# Patient Record
Sex: FEMALE | Race: OTHER | NOT HISPANIC OR LATINO
[De-identification: names, ages, dates, MRNs, and addresses within clinical notes are randomized per-mention and may not be internally consistent; named-entity substitution may affect disease eponyms.]

---

## 2018-09-11 ENCOUNTER — APPOINTMENT (OUTPATIENT)
Dept: INTERNAL MEDICINE | Facility: CLINIC | Age: 58
End: 2018-09-11
Payer: COMMERCIAL

## 2018-09-11 VITALS
WEIGHT: 214 LBS | OXYGEN SATURATION: 96 % | TEMPERATURE: 97.4 F | BODY MASS INDEX: 34.39 KG/M2 | HEART RATE: 63 BPM | DIASTOLIC BLOOD PRESSURE: 76 MMHG | SYSTOLIC BLOOD PRESSURE: 118 MMHG | HEIGHT: 66 IN

## 2018-09-11 DIAGNOSIS — Z83.3 FAMILY HISTORY OF DIABETES MELLITUS: ICD-10-CM

## 2018-09-11 DIAGNOSIS — Z87.891 PERSONAL HISTORY OF NICOTINE DEPENDENCE: ICD-10-CM

## 2018-09-11 DIAGNOSIS — Z82.69 FAMILY HISTORY OF OTHER DISEASES OF THE MUSCULOSKELETAL SYSTEM AND CONNECTIVE TISSUE: ICD-10-CM

## 2018-09-11 PROCEDURE — 93000 ELECTROCARDIOGRAM COMPLETE: CPT

## 2018-09-11 PROCEDURE — 99386 PREV VISIT NEW AGE 40-64: CPT | Mod: 25

## 2018-09-11 PROCEDURE — 36415 COLL VENOUS BLD VENIPUNCTURE: CPT

## 2018-09-11 PROCEDURE — 94010 BREATHING CAPACITY TEST: CPT

## 2018-09-13 LAB
25(OH)D3 SERPL-MCNC: 40.5 NG/ML
ALBUMIN SERPL ELPH-MCNC: 4 G/DL
ALP BLD-CCNC: 62 U/L
ALT SERPL-CCNC: 37 U/L
ANION GAP SERPL CALC-SCNC: 15 MMOL/L
AST SERPL-CCNC: 31 U/L
BASOPHILS # BLD AUTO: 0.02 K/UL
BASOPHILS NFR BLD AUTO: 0.3 %
BILIRUB SERPL-MCNC: 0.7 MG/DL
BUN SERPL-MCNC: 9 MG/DL
CALCIUM SERPL-MCNC: 9.1 MG/DL
CHLORIDE SERPL-SCNC: 103 MMOL/L
CHOLEST SERPL-MCNC: 142 MG/DL
CHOLEST/HDLC SERPL: 1.7 RATIO
CO2 SERPL-SCNC: 25 MMOL/L
CREAT SERPL-MCNC: 0.62 MG/DL
EOSINOPHIL # BLD AUTO: 0.28 K/UL
EOSINOPHIL NFR BLD AUTO: 4.7 %
GLUCOSE SERPL-MCNC: 113 MG/DL
HBA1C MFR BLD HPLC: 6.8 %
HCT VFR BLD CALC: 37.7 %
HDLC SERPL-MCNC: 85 MG/DL
HGB BLD-MCNC: 11.7 G/DL
IMM GRANULOCYTES NFR BLD AUTO: 0.2 %
LDLC SERPL CALC-MCNC: 46 MG/DL
LDLC SERPL DIRECT ASSAY-MCNC: 42 MG/DL
LYMPHOCYTES # BLD AUTO: 2.26 K/UL
LYMPHOCYTES NFR BLD AUTO: 37.8 %
MAN DIFF?: NORMAL
MCHC RBC-ENTMCNC: 28.4 PG
MCHC RBC-ENTMCNC: 31 GM/DL
MCV RBC AUTO: 91.5 FL
MONOCYTES # BLD AUTO: 0.49 K/UL
MONOCYTES NFR BLD AUTO: 8.2 %
NEUTROPHILS # BLD AUTO: 2.92 K/UL
NEUTROPHILS NFR BLD AUTO: 48.8 %
PLATELET # BLD AUTO: 245 K/UL
POTASSIUM SERPL-SCNC: 4.5 MMOL/L
PROT SERPL-MCNC: 7.3 G/DL
RBC # BLD: 4.12 M/UL
RBC # FLD: 14.5 %
SAVE SPECIMEN: NORMAL
SODIUM SERPL-SCNC: 143 MMOL/L
TRIGL SERPL-MCNC: 53 MG/DL
TSH SERPL-ACNC: 2.03 UIU/ML
VIT B12 SERPL-MCNC: 651 PG/ML
WBC # FLD AUTO: 5.98 K/UL

## 2018-09-13 NOTE — PLAN
[FreeTextEntry1] : will ck thyroid test in 1 mo\par EKG- non specific ant T wave   NSR 61- pt will release old EKG\par pt will release old records- CT, US\par spirometry -nl \par vaccine given by nurse.\par pt requesting letter to be off from work today\par

## 2018-09-13 NOTE — PHYSICAL EXAM
[No Acute Distress] : no acute distress [Well Nourished] : well nourished [Well Developed] : well developed [Well-Appearing] : well-appearing [Normal Sclera/Conjunctiva] : normal sclera/conjunctiva [PERRL] : pupils equal round and reactive to light [Normal Outer Ear/Nose] : the outer ears and nose were normal in appearance [Normal Oropharynx] : the oropharynx was normal [Normal TMs] : both tympanic membranes were normal [Supple] : supple [No Lymphadenopathy] : no lymphadenopathy [Thyroid Normal, No Nodules] : the thyroid was normal and there were no nodules present [No Respiratory Distress] : no respiratory distress  [Clear to Auscultation] : lungs were clear to auscultation bilaterally [No Accessory Muscle Use] : no accessory muscle use [Normal Rate] : normal rate  [Regular Rhythm] : with a regular rhythm [Normal S1, S2] : normal S1 and S2 [No Murmur] : no murmur heard [No Edema] : there was no peripheral edema [Soft] : abdomen soft [Non Tender] : non-tender [Non-distended] : non-distended [No Masses] : no abdominal mass palpated [Normal Bowel Sounds] : normal bowel sounds [Normal Supraclavicular Nodes] : no supraclavicular lymphadenopathy [Normal Axillary Nodes] : no axillary lymphadenopathy [Normal Posterior Cervical Nodes] : no posterior cervical lymphadenopathy [Normal Anterior Cervical Nodes] : no anterior cervical lymphadenopathy [Normal Inguinal Nodes] : no inguinal lymphadenopathy [Grossly Normal Strength/Tone] : grossly normal strength/tone [Coordination Grossly Intact] : coordination grossly intact [No Focal Deficits] : no focal deficits [Normal Affect] : the affect was normal [Normal Insight/Judgement] : insight and judgment were intact [No Carotid Bruits] : no carotid bruits [Pedal Pulses Present] : the pedal pulses are present [de-identified] : no rash on feet

## 2018-09-13 NOTE — HEALTH RISK ASSESSMENT
[0] : 2) Feeling down, depressed, or hopeless: Not at all (0) [Patient reported mammogram was normal] : Patient reported mammogram was normal [Patient reported PAP Smear was normal] : Patient reported PAP Smear was normal [HIV test declined] : HIV test declined [MammogramDate] : 3/18 [PapSmearDate] : 3/18 [PapSmearComments] : last GYN visit- 3/18-breast exam [ColonoscopyDate] : never had colonoscopy [de-identified] : wear glasses.  not seen ophtho recently [de-identified] : last seen dentist- 2 yr ago

## 2018-09-13 NOTE — HISTORY OF PRESENT ILLNESS
[FreeTextEntry1] : CPE [de-identified] : vaccine- had tdap.  refused flu vaccine\par  diet- pt trying to eating healthy\par exercise- no\par started thyroid meds 2 wk ago\par \par c/o itching - upper back-- sev yr- occurs weekly- duration- sev hr. \par \par pt states she had CT abd /chest.  US abd- something was enlarged?\par c/o  heaviness of her chest and dyspnea, abd bloating, diarrhea.- occurs w fatty meals.- and gets diarrhea. resolves after bm.  these symptoms since gastric bypass.   no CP w activity. \par states she has a lump - RUQ- " pokes up after lift heavy lifting" - pt was told she has no hernia on CT. \par \par \par pt had stopped fe. \par \par

## 2018-09-25 ENCOUNTER — RESULT REVIEW (OUTPATIENT)
Age: 58
End: 2018-09-25

## 2018-09-25 ENCOUNTER — RESULT CHARGE (OUTPATIENT)
Age: 58
End: 2018-09-25

## 2018-10-23 ENCOUNTER — APPOINTMENT (OUTPATIENT)
Dept: GASTROENTEROLOGY | Facility: CLINIC | Age: 58
End: 2018-10-23

## 2018-11-02 ENCOUNTER — RX RENEWAL (OUTPATIENT)
Age: 58
End: 2018-11-02

## 2019-03-08 ENCOUNTER — APPOINTMENT (OUTPATIENT)
Dept: GASTROENTEROLOGY | Facility: CLINIC | Age: 59
End: 2019-03-08
Payer: COMMERCIAL

## 2019-03-08 VITALS
WEIGHT: 214 LBS | SYSTOLIC BLOOD PRESSURE: 121 MMHG | TEMPERATURE: 98 F | DIASTOLIC BLOOD PRESSURE: 80 MMHG | HEIGHT: 66 IN | BODY MASS INDEX: 34.39 KG/M2

## 2019-03-08 DIAGNOSIS — K83.8 OTHER SPECIFIED DISEASES OF BILIARY TRACT: ICD-10-CM

## 2019-03-08 DIAGNOSIS — R14.0 ABDOMINAL DISTENSION (GASEOUS): ICD-10-CM

## 2019-03-08 PROCEDURE — 99203 OFFICE O/P NEW LOW 30 MIN: CPT

## 2019-03-08 NOTE — HISTORY OF PRESENT ILLNESS
[FreeTextEntry1] : 59 yo with h/o gastric bypass in 2004, s/p CT scan in 2018  dilated IHD and EHD upto 16mm, s/p cholecystectomy,\par nodularity medial aspect of duodenal sweep  and  region of ampulla. r/o neoplasm\par \par Patient with c/o after eating patient feels nausea after eating fried foods. no weight loss. chronic nausea, no vomiting. normal bms, no brbpr, no melena.\par \par patient never had a colonoscopy.\par \par no family h/o colon cancer.

## 2019-03-08 NOTE — REVIEW OF SYSTEMS
[Fever] : no fever [Chills] : no chills [Red Eyes] : eyes not red [Heart Rate Is Slow] : the heart rate was not slow [Heart Rate Is Fast] : the heart rate was not fast [Chest Pain] : no chest pain [Shortness Of Breath] : no shortness of breath [Wheezing] : no wheezing [Cough] : no cough [SOB on Exertion] : no shortness of breath during exertion [As Noted in HPI] : as noted in HPI [Arthralgias] : no arthralgias [Joint Pain] : no joint pain [Skin Lesions] : no skin lesions [Dizziness] : no dizziness [Fainting] : no fainting [Anxiety] : no anxiety [Depression] : no depression [Muscle Weakness] : no muscle weakness [Easy Bleeding] : no tendency for easy bleeding [Easy Bruising] : no tendency for easy bruising

## 2019-03-08 NOTE — PHYSICAL EXAM
[General Appearance - Alert] : alert [General Appearance - In No Acute Distress] : in no acute distress [Sclera] : the sclera and conjunctiva were normal [Hearing Threshold Finger Rub Not [place]] : hearing was normal [Respiration, Rhythm And Depth] : normal respiratory rhythm and effort [Auscultation Breath Sounds / Voice Sounds] : lungs were clear to auscultation bilaterally [Heart Sounds] : normal S1 and S2 [Bowel Sounds] : normal bowel sounds [Abdomen Soft] : soft [Abdomen Tenderness] : non-tender [FreeTextEntry1] : well healed scar, approximately 1cm hard nodule in ruq [No CVA Tenderness] : no ~M costovertebral angle tenderness [Abnormal Walk] : normal gait [Skin Color & Pigmentation] : normal skin color and pigmentation [] : no rash [Skin Lesions] : no skin lesions [Sensation] : the sensory exam was normal to light touch and pinprick [Motor Exam] : the motor exam was normal [No Focal Deficits] : no focal deficits [Oriented To Time, Place, And Person] : oriented to person, place, and time

## 2019-03-08 NOTE — ASSESSMENT
[FreeTextEntry1] : 1.dilated bilary tree wth possible ampullary, duodenal lesion on ct scan done 2018,normal lfts.h/o cholecystectomy, vague ruq pain and nodule.\par \par Plan recommend referral for EUS \par         -recent labs requested from pcp,pt to have faxed to me\par \par 2. ruq nodule and discomfort\par \par plan abdominal us\par          ppi daily\par       antireflux diet\par \par 3. average risk for colon cancer recommend colonoscopy for screenign\par \par Discussed risks including but not limited to bleeding,infection,drug reaction, perforation,missed lesion,benefits and alternatives of colonoscopy/egd with patient  including no\par treatment and patient consents to procedure.\par \par plan colonoscopy to be scheduled.

## 2019-03-13 ENCOUNTER — FORM ENCOUNTER (OUTPATIENT)
Age: 59
End: 2019-03-13

## 2019-03-14 ENCOUNTER — APPOINTMENT (OUTPATIENT)
Dept: RADIOLOGY | Facility: CLINIC | Age: 59
End: 2019-03-14

## 2019-03-14 ENCOUNTER — OUTPATIENT (OUTPATIENT)
Dept: OUTPATIENT SERVICES | Facility: HOSPITAL | Age: 59
LOS: 1 days | End: 2019-03-14
Payer: COMMERCIAL

## 2019-03-14 ENCOUNTER — APPOINTMENT (OUTPATIENT)
Dept: INTERNAL MEDICINE | Facility: CLINIC | Age: 59
End: 2019-03-14
Payer: COMMERCIAL

## 2019-03-14 VITALS
SYSTOLIC BLOOD PRESSURE: 128 MMHG | HEIGHT: 66 IN | WEIGHT: 214 LBS | TEMPERATURE: 98 F | DIASTOLIC BLOOD PRESSURE: 78 MMHG | BODY MASS INDEX: 34.39 KG/M2

## 2019-03-14 DIAGNOSIS — R07.81 PLEURODYNIA: ICD-10-CM

## 2019-03-14 DIAGNOSIS — Z00.8 ENCOUNTER FOR OTHER GENERAL EXAMINATION: ICD-10-CM

## 2019-03-14 PROCEDURE — 71100 X-RAY EXAM RIBS UNI 2 VIEWS: CPT

## 2019-03-14 PROCEDURE — 71046 X-RAY EXAM CHEST 2 VIEWS: CPT | Mod: 26

## 2019-03-14 PROCEDURE — 99214 OFFICE O/P EST MOD 30 MIN: CPT

## 2019-03-14 PROCEDURE — 71046 X-RAY EXAM CHEST 2 VIEWS: CPT

## 2019-03-14 PROCEDURE — 71100 X-RAY EXAM RIBS UNI 2 VIEWS: CPT | Mod: 26,LT

## 2019-03-20 ENCOUNTER — RESULT REVIEW (OUTPATIENT)
Age: 59
End: 2019-03-20

## 2019-04-01 ENCOUNTER — FORM ENCOUNTER (OUTPATIENT)
Age: 59
End: 2019-04-01

## 2019-04-02 ENCOUNTER — APPOINTMENT (OUTPATIENT)
Dept: ULTRASOUND IMAGING | Facility: CLINIC | Age: 59
End: 2019-04-02
Payer: COMMERCIAL

## 2019-04-02 ENCOUNTER — OUTPATIENT (OUTPATIENT)
Dept: OUTPATIENT SERVICES | Facility: HOSPITAL | Age: 59
LOS: 1 days | End: 2019-04-02
Payer: COMMERCIAL

## 2019-04-02 DIAGNOSIS — Z98.84 BARIATRIC SURGERY STATUS: ICD-10-CM

## 2019-04-02 DIAGNOSIS — R14.0 ABDOMINAL DISTENSION (GASEOUS): ICD-10-CM

## 2019-04-02 PROCEDURE — 76700 US EXAM ABDOM COMPLETE: CPT | Mod: 26

## 2019-04-02 PROCEDURE — 76700 US EXAM ABDOM COMPLETE: CPT

## 2019-04-04 ENCOUNTER — RX RENEWAL (OUTPATIENT)
Age: 59
End: 2019-04-04

## 2019-05-02 ENCOUNTER — APPOINTMENT (OUTPATIENT)
Dept: INTERNAL MEDICINE | Facility: CLINIC | Age: 59
End: 2019-05-02

## 2019-05-09 ENCOUNTER — RX RENEWAL (OUTPATIENT)
Age: 59
End: 2019-05-09

## 2019-05-23 ENCOUNTER — LABORATORY RESULT (OUTPATIENT)
Age: 59
End: 2019-05-23

## 2019-05-23 ENCOUNTER — APPOINTMENT (OUTPATIENT)
Dept: GASTROENTEROLOGY | Facility: CLINIC | Age: 59
End: 2019-05-23
Payer: COMMERCIAL

## 2019-05-23 PROCEDURE — 45380 COLONOSCOPY AND BIOPSY: CPT | Mod: 59

## 2019-05-23 PROCEDURE — 45385 COLONOSCOPY W/LESION REMOVAL: CPT

## 2019-06-25 ENCOUNTER — APPOINTMENT (OUTPATIENT)
Dept: INTERNAL MEDICINE | Facility: CLINIC | Age: 59
End: 2019-06-25
Payer: COMMERCIAL

## 2019-06-25 VITALS
TEMPERATURE: 97.5 F | BODY MASS INDEX: 34.39 KG/M2 | HEIGHT: 66 IN | OXYGEN SATURATION: 98 % | WEIGHT: 214 LBS | DIASTOLIC BLOOD PRESSURE: 72 MMHG | SYSTOLIC BLOOD PRESSURE: 121 MMHG | HEART RATE: 70 BPM

## 2019-06-25 VITALS — DIASTOLIC BLOOD PRESSURE: 84 MMHG | SYSTOLIC BLOOD PRESSURE: 120 MMHG
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
## 2019-06-25 DIAGNOSIS — Z87.828 PERSONAL HISTORY OF OTHER (HEALED) PHYSICAL INJURY AND TRAUMA: ICD-10-CM

## 2019-06-25 PROCEDURE — 36415 COLL VENOUS BLD VENIPUNCTURE: CPT

## 2019-06-25 PROCEDURE — 99214 OFFICE O/P EST MOD 30 MIN: CPT | Mod: 25

## 2019-06-25 RX ORDER — OMEPRAZOLE 40 MG/1
40 CAPSULE, DELAYED RELEASE ORAL
Qty: 30 | Refills: 2 | Status: DISCONTINUED | COMMUNITY
Start: 2019-03-08 | End: 2019-06-25

## 2019-06-25 NOTE — HISTORY OF PRESENT ILLNESS
[de-identified] : US   abd- small fluid collection  ? retained suture- pt still has to make appt w Dr. Dickinson.\par also pt was told to f/u w [facility] for EUS\par 5/2019- colonoscopy- polyp. rpt 3 yr\par \par abd bloating, nausea- greasy food cause nausea.  pt is not taking PPI\par \par DM-  Patient is compliant with med.\par  Patient  is compliant with diet.\par  Patient  exercises- walking 4 day/ wk- no dyspnea\par  She denies hypoglycemic episodes.  The patient has no dizziness/ tremor/ diaphoresis \par    the fasting glucose is 110-115\par The bedtime glucose is not checked\par The postprandial glucose is not checked\par Patient has been seen by Ophthalmology.-201`7\par Patient has been seen by podiatry.-2018\par vaccines-pt will ck records\par \par lipid- statin- pt is compliant w meds.  non compliant w  diet- no myalgia\par \par \par  [FreeTextEntry1] : DM

## 2019-06-25 NOTE — PLAN
[FreeTextEntry1] : pt will release vaccination and old records \par pt reminded to f/u w Dr. Dickinson, Dr. Cantu\par pt wants HIV test mailed to her.

## 2019-06-25 NOTE — PHYSICAL EXAM
[Right Foot Was Examined] : Right foot ~C was examined [Left Foot Was Examined] : left foot ~C was examined [] : normal

## 2019-06-26 ENCOUNTER — RX CHANGE (OUTPATIENT)
Age: 59
End: 2019-06-26

## 2019-06-26 ENCOUNTER — RX RENEWAL (OUTPATIENT)
Age: 59
End: 2019-06-26

## 2019-06-26 LAB
ALBUMIN SERPL ELPH-MCNC: 4.8 G/DL
ALP BLD-CCNC: 67 U/L
ALT SERPL-CCNC: 28 U/L
ANION GAP SERPL CALC-SCNC: 10 MMOL/L
AST SERPL-CCNC: 29 U/L
BILIRUB SERPL-MCNC: 0.7 MG/DL
BUN SERPL-MCNC: 13 MG/DL
CALCIUM SERPL-MCNC: 9.6 MG/DL
CHLORIDE SERPL-SCNC: 102 MMOL/L
CHOLEST SERPL-MCNC: 164 MG/DL
CHOLEST/HDLC SERPL: 1.6 RATIO
CO2 SERPL-SCNC: 28 MMOL/L
CREAT SERPL-MCNC: 0.57 MG/DL
ESTIMATED AVERAGE GLUCOSE: 154 MG/DL
GLUCOSE SERPL-MCNC: 118 MG/DL
HBA1C MFR BLD HPLC: 7 %
HCV AB SER QL: NONREACTIVE
HCV S/CO RATIO: 0.11 S/CO
HDLC SERPL-MCNC: 100 MG/DL
HIV1+2 AB SPEC QL IA.RAPID: NONREACTIVE
LDLC SERPL CALC-MCNC: 56 MG/DL
POTASSIUM SERPL-SCNC: 4.2 MMOL/L
PROT SERPL-MCNC: 8 G/DL
SAVE SPECIMEN: NORMAL
SODIUM SERPL-SCNC: 140 MMOL/L
TRIGL SERPL-MCNC: 38 MG/DL
TSH SERPL-ACNC: 2.09 UIU/ML

## 2019-07-08 ENCOUNTER — RESULT REVIEW (OUTPATIENT)
Age: 59
End: 2019-07-08

## 2019-07-15 ENCOUNTER — RESULT REVIEW (OUTPATIENT)
Age: 59
End: 2019-07-15

## 2019-07-15 RX ORDER — LEVOTHYROXINE SODIUM 0.03 MG/1
25 TABLET ORAL DAILY
Qty: 90 | Refills: 1 | Status: DISCONTINUED | COMMUNITY
Start: 1900-01-01 | End: 2019-07-15

## 2019-08-05 ENCOUNTER — APPOINTMENT (OUTPATIENT)
Dept: PLASTIC SURGERY | Facility: CLINIC | Age: 59
End: 2019-08-05

## 2019-08-29 ENCOUNTER — APPOINTMENT (OUTPATIENT)
Dept: SURGERY | Facility: CLINIC | Age: 59
End: 2019-08-29
Payer: COMMERCIAL

## 2019-08-29 VITALS
HEART RATE: 67 BPM | WEIGHT: 214 LBS | OXYGEN SATURATION: 99 % | DIASTOLIC BLOOD PRESSURE: 84 MMHG | HEIGHT: 67 IN | RESPIRATION RATE: 15 BRPM | SYSTOLIC BLOOD PRESSURE: 127 MMHG | TEMPERATURE: 98.4 F | BODY MASS INDEX: 33.59 KG/M2

## 2019-08-29 DIAGNOSIS — L98.9 DISORDER OF THE SKIN AND SUBCUTANEOUS TISSUE, UNSPECIFIED: ICD-10-CM

## 2019-08-29 PROCEDURE — 99244 OFF/OP CNSLTJ NEW/EST MOD 40: CPT

## 2019-08-29 RX ORDER — MULTIVITAMIN
TABLET ORAL
Refills: 0 | Status: ACTIVE | COMMUNITY

## 2019-08-29 NOTE — PHYSICAL EXAM
[Normal Breath Sounds] : Normal breath sounds [Normal Heart Sounds] : normal heart sounds [No HSM] : no hepatosplenomegaly [No Rash or Lesion] : No rash or lesion [Alert] : alert [Oriented to Person] : oriented to person [Oriented to Place] : oriented to place [Oriented to Time] : oriented to time [Calm] : calm [Abdominal Masses] : No abdominal masses [de-identified] : wnl [de-identified] : Well-developed well-nourished overweight black female in no acute distress [Abdomen Tenderness] : ~T ~M No abdominal tenderness [de-identified] : Normal strength and gait [de-identified] : In the area of the right upper quadrant drain site there is a palpable mass which is nontender. The mass feels subcutaneous.

## 2019-08-29 NOTE — ASSESSMENT
[FreeTextEntry1] : I suggested to the patient that as an outpatient we should explore this area and resect the underlying pathology which could be a suture, retained stone, or piece of a drain. The patient stated that she will go home and think about it and if she decides to undergo the procedure will call our office. She understands that she will need preop medical clearance from her primary care doctor.

## 2019-08-29 NOTE — HISTORY OF PRESENT ILLNESS
[de-identified] : Yris Huerta is a 59 y/o female seen for an evaluation of a lesion on a surgical suture line.. The patient a number of years ago had an open cholecystectomy. In the postoperative period she had a drain in the right upper quadrant. That drain was removed and the patient states he was exceedingly painful when the surgeon pulled the tube out. Since then the patient has complained of something in the right upper quadrant around his drain site. Recently the patient had an ultrasound of the area which showed some fluid. Since that ultrasound fluid is drained from this old drain site. The patient states she still feels something under the area.  The patient states she has never had any fever or chills related to this area.

## 2019-09-11 ENCOUNTER — OTHER (OUTPATIENT)
Age: 59
End: 2019-09-11

## 2019-09-23 ENCOUNTER — CLINICAL ADVICE (OUTPATIENT)
Age: 59
End: 2019-09-23

## 2019-09-24 ENCOUNTER — RX RENEWAL (OUTPATIENT)
Age: 59
End: 2019-09-24

## 2019-09-30 ENCOUNTER — FORM ENCOUNTER (OUTPATIENT)
Age: 59
End: 2019-09-30

## 2019-10-01 ENCOUNTER — APPOINTMENT (OUTPATIENT)
Dept: MRI IMAGING | Facility: CLINIC | Age: 59
End: 2019-10-01
Payer: COMMERCIAL

## 2019-10-01 ENCOUNTER — OUTPATIENT (OUTPATIENT)
Dept: OUTPATIENT SERVICES | Facility: HOSPITAL | Age: 59
LOS: 1 days | End: 2019-10-01
Payer: COMMERCIAL

## 2019-10-01 DIAGNOSIS — K83.8 OTHER SPECIFIED DISEASES OF BILIARY TRACT: ICD-10-CM

## 2019-10-01 PROCEDURE — 74183 MRI ABD W/O CNTR FLWD CNTR: CPT

## 2019-10-01 PROCEDURE — A9585: CPT

## 2019-10-01 PROCEDURE — 74183 MRI ABD W/O CNTR FLWD CNTR: CPT | Mod: 26

## 2019-11-20 ENCOUNTER — RX RENEWAL (OUTPATIENT)
Age: 59
End: 2019-11-20

## 2019-12-02 ENCOUNTER — APPOINTMENT (OUTPATIENT)
Dept: INTERNAL MEDICINE | Facility: CLINIC | Age: 59
End: 2019-12-02
Payer: COMMERCIAL

## 2019-12-02 VITALS
OXYGEN SATURATION: 97 % | BODY MASS INDEX: 33.27 KG/M2 | WEIGHT: 212 LBS | HEART RATE: 64 BPM | DIASTOLIC BLOOD PRESSURE: 74 MMHG | HEIGHT: 67 IN | TEMPERATURE: 98 F | SYSTOLIC BLOOD PRESSURE: 118 MMHG

## 2019-12-02 DIAGNOSIS — Z11.59 ENCOUNTER FOR SCREENING FOR OTHER VIRAL DISEASES: ICD-10-CM

## 2019-12-02 DIAGNOSIS — Z11.4 ENCOUNTER FOR SCREENING FOR HUMAN IMMUNODEFICIENCY VIRUS [HIV]: ICD-10-CM

## 2019-12-02 DIAGNOSIS — Z86.39 PERSONAL HISTORY OF OTHER ENDOCRINE, NUTRITIONAL AND METABOLIC DISEASE: ICD-10-CM

## 2019-12-02 DIAGNOSIS — R07.89 OTHER CHEST PAIN: ICD-10-CM

## 2019-12-02 PROCEDURE — 36415 COLL VENOUS BLD VENIPUNCTURE: CPT

## 2019-12-02 PROCEDURE — 99214 OFFICE O/P EST MOD 30 MIN: CPT | Mod: 25

## 2019-12-02 PROCEDURE — 93000 ELECTROCARDIOGRAM COMPLETE: CPT

## 2019-12-02 RX ORDER — COLD-HOT PACK
125 MCG EACH MISCELLANEOUS
Refills: 0 | Status: DISCONTINUED | COMMUNITY
End: 2019-12-02

## 2019-12-02 RX ORDER — ALPRAZOLAM 0.5 MG/1
0.5 TABLET ORAL
Qty: 2 | Refills: 0 | Status: DISCONTINUED | COMMUNITY
Start: 2019-09-23 | End: 2019-12-02

## 2019-12-02 NOTE — PHYSICAL EXAM
[Right Foot Was Examined] : Right foot ~C was examined [Left Foot Was Examined] : left foot ~C was examined [] : normal [TWNoteComboBox5] : +2

## 2019-12-02 NOTE — HISTORY OF PRESENT ILLNESS
[FreeTextEntry1] : DM [de-identified] : DM-  Patient is compliant with med.\par  Patient  is compliant with diet.\par  Patient  exercises-no\par She had hypoglycemic episode ( HA)when she didn't eat this AM so had coffee.  \par    the fasting glucose is not checked\par The bedtime glucose is not checked\par The postprandial glucose is not checked.\par Patient has been seen by Ophthalmology.- 10/19\par Patient has been seen by podiatry.-9/19\par vaccines- pt will ck rec for pneum 23.  pt had flu vac 10/19\par \par pt has chest tightness when she has nausea- started 2 mo ago.  chest tightness, nausea- occurs after greasy food, dairy.\par chest tightness is not related to activity- duration- 30 min.  - occurs daily. no alleviating factors.  no assoc w dyspnea.  no meds taken\par \par lipid- statin- pt is compliant w meds, diet.  no myalgia.  pt had c/o muscle cramping of her calves - 7/19which has since then resolved\par U microalbumin\par pt still has nausea.  pt had MRI- dilated extrahepatic duct.  pt reji for EGD.- occasionally takes ibuprofen for knee pain\par pt has appt for Dr. Dickinson- pt has drainage over the previous area of surgery.  MRCP was rec by GI\par pt has vaginal itching - pt had used monistat- improved symptoms but dev symptoms again in less than 1mo.  pt made appt w GYN\par bl knee pain- chronic- had knee surgery 2009,2010-pt has appt w Ortho\par pt states she hasn't taken thyroid meds 09/'18

## 2019-12-02 NOTE — HEALTH RISK ASSESSMENT
[Never (0 pts)] : Never (0 points) [No] : In the past 12 months have you used drugs other than those required for medical reasons? No [0] : 1) Little interest or pleasure doing things: Not at all (0) [Audit-CScore] : 0

## 2019-12-03 LAB
25(OH)D3 SERPL-MCNC: 33.4 NG/ML
ALBUMIN SERPL ELPH-MCNC: 4.3 G/DL
ALP BLD-CCNC: 61 U/L
ALT SERPL-CCNC: 24 U/L
ANION GAP SERPL CALC-SCNC: 11 MMOL/L
AST SERPL-CCNC: 29 U/L
BILIRUB SERPL-MCNC: 0.7 MG/DL
BUN SERPL-MCNC: 11 MG/DL
CALCIUM SERPL-MCNC: 9.5 MG/DL
CHLORIDE SERPL-SCNC: 102 MMOL/L
CHOLEST SERPL-MCNC: 161 MG/DL
CHOLEST/HDLC SERPL: 2 RATIO
CO2 SERPL-SCNC: 28 MMOL/L
CREAT SERPL-MCNC: 0.59 MG/DL
ESTIMATED AVERAGE GLUCOSE: 148 MG/DL
GLUCOSE SERPL-MCNC: 128 MG/DL
HBA1C MFR BLD HPLC: 6.8 %
HDLC SERPL-MCNC: 82 MG/DL
LDLC SERPL CALC-MCNC: 68 MG/DL
POTASSIUM SERPL-SCNC: 4.2 MMOL/L
PROT SERPL-MCNC: 7.3 G/DL
SAVE SPECIMEN: NORMAL
SODIUM SERPL-SCNC: 141 MMOL/L
TRIGL SERPL-MCNC: 53 MG/DL
VIT B12 SERPL-MCNC: 1965 PG/ML

## 2019-12-10 ENCOUNTER — APPOINTMENT (OUTPATIENT)
Dept: SURGERY | Facility: CLINIC | Age: 59
End: 2019-12-10
Payer: COMMERCIAL

## 2019-12-10 VITALS
OXYGEN SATURATION: 98 % | HEIGHT: 67 IN | RESPIRATION RATE: 15 BRPM | DIASTOLIC BLOOD PRESSURE: 83 MMHG | WEIGHT: 208 LBS | TEMPERATURE: 98.5 F | BODY MASS INDEX: 32.65 KG/M2 | SYSTOLIC BLOOD PRESSURE: 120 MMHG | HEART RATE: 62 BPM

## 2019-12-10 PROCEDURE — 99203 OFFICE O/P NEW LOW 30 MIN: CPT

## 2019-12-10 RX ORDER — MULTIVITAMIN
TABLET ORAL
Refills: 0 | Status: DISCONTINUED | COMMUNITY
End: 2019-12-10

## 2019-12-10 NOTE — PHYSICAL EXAM
[Normal Breath Sounds] : Normal breath sounds [Normal Rate and Rhythm] : normal rate and rhythm [de-identified] : nad [de-identified] : Well-healed midline and right subcostal incisions. There is a tiny open area just cephalad to her previous right subcostal incision. There is a small area of underlying induration without obvious cellulitis or abscess. There is no palpable subcutaneous foreign body. In her mid upper back there is a small sebaceous cyst without evidence of infection

## 2019-12-10 NOTE — HISTORY OF PRESENT ILLNESS
[de-identified] : Yris is a 60 y/o female here for evaluation of abdominal pain. Abdominal ultrasound from 4/2/19 demonstrated no sonographic evidence of abdominal wall hernia. Small fluid collection in the subcutaneous fat of the right upper quadrant corresponding to the site of the palpable finding. Presence of a curvilinear object within the fluid suggests the possibility of retained suture or catheter fragment, with surrounding reactive fluid. Dilated common bile duct. She subsequently underwent an MRI of the abdomen and pelvis which was read as no abdominal wall abnormality. She complains of mild discomfort and persistent drainage from a small open area just cephalad to her previous right subcostal incision. She also has a intermittent sebaceous cyst of her upper mid back which drains spontaneously

## 2019-12-10 NOTE — ASSESSMENT
[FreeTextEntry1] : In summary the patient has a chronic small right upper quadrant wound. I will review her MRI and is there is evidence of foreign body I will recommend wound exploration with preoperative radiologic marking. She would also like her sebaceous cysts of her mid upper back excised.

## 2019-12-12 ENCOUNTER — FORM ENCOUNTER (OUTPATIENT)
Age: 59
End: 2019-12-12

## 2019-12-13 ENCOUNTER — OUTPATIENT (OUTPATIENT)
Dept: OUTPATIENT SERVICES | Facility: HOSPITAL | Age: 59
LOS: 1 days | End: 2019-12-13
Payer: COMMERCIAL

## 2019-12-13 ENCOUNTER — APPOINTMENT (OUTPATIENT)
Dept: CT IMAGING | Facility: CLINIC | Age: 59
End: 2019-12-13
Payer: COMMERCIAL

## 2019-12-13 DIAGNOSIS — Z00.8 ENCOUNTER FOR OTHER GENERAL EXAMINATION: ICD-10-CM

## 2019-12-13 PROCEDURE — 74177 CT ABD & PELVIS W/CONTRAST: CPT

## 2019-12-13 PROCEDURE — 74177 CT ABD & PELVIS W/CONTRAST: CPT | Mod: 26

## 2019-12-13 PROCEDURE — 82565 ASSAY OF CREATININE: CPT

## 2019-12-16 ENCOUNTER — OTHER (OUTPATIENT)
Age: 59
End: 2019-12-16

## 2019-12-17 ENCOUNTER — CHART COPY (OUTPATIENT)
Age: 59
End: 2019-12-17

## 2020-01-24 ENCOUNTER — APPOINTMENT (OUTPATIENT)
Dept: INTERNAL MEDICINE | Facility: CLINIC | Age: 60
End: 2020-01-24

## 2020-02-06 ENCOUNTER — APPOINTMENT (OUTPATIENT)
Dept: PLASTIC SURGERY | Facility: CLINIC | Age: 60
End: 2020-02-06
Payer: COMMERCIAL

## 2020-02-06 PROCEDURE — 99244 OFF/OP CNSLTJ NEW/EST MOD 40: CPT

## 2020-02-11 ENCOUNTER — APPOINTMENT (OUTPATIENT)
Dept: INTERNAL MEDICINE | Facility: CLINIC | Age: 60
End: 2020-02-11
Payer: COMMERCIAL

## 2020-02-11 VITALS
HEART RATE: 65 BPM | BODY MASS INDEX: 33.27 KG/M2 | WEIGHT: 212 LBS | SYSTOLIC BLOOD PRESSURE: 118 MMHG | TEMPERATURE: 97.7 F | HEIGHT: 67 IN | OXYGEN SATURATION: 98 % | DIASTOLIC BLOOD PRESSURE: 78 MMHG

## 2020-02-11 PROCEDURE — 90732 PPSV23 VACC 2 YRS+ SUBQ/IM: CPT

## 2020-02-11 PROCEDURE — 99214 OFFICE O/P EST MOD 30 MIN: CPT | Mod: 25

## 2020-02-11 PROCEDURE — 36415 COLL VENOUS BLD VENIPUNCTURE: CPT

## 2020-02-11 PROCEDURE — G0009: CPT

## 2020-02-11 NOTE — HISTORY OF PRESENT ILLNESS
[FreeTextEntry1] : DM [de-identified] : DM-  Patient is compliant with med.\par  Patient  is compliant with diet.\par  Patient  exercises-  toning\par She denies hypoglycemic episodes.  The patient has no dizziness/ tremor/ diaphoresis \par    the fasting glucose is 108,110\par The bedtime glucose is not ck\par The postprandial glucose is not ck\par Patient has been seen by Ophthalmology.- 10/19\par Patient has been seen by podiatry.\par vaccines-rec pneum 23 vac\par  no CP or SOB\par \par lipid- statin- pt is compliant w meds, non compliant diet.  no myalgia\par U microalbumin-6/19\par takes PPI- only if she is eating fatty- takes PPI once a wk\par chest tightness-resolved\par palpitation- pt was drinking 2-3 cups of coffee.  pt now cut down 2-3 cups/ wk- palpitation is signif better. now drinking green tea.  last episode of palpitation - last wk\par nausea resolved\par pt had CT abd- chronic sinus track \par elev vit B12-  pt stopped vitamin B12 in 12/19\par and likely low grade subcut wound infection- surgery rec by Dr. Dickinson.\par \par

## 2020-02-12 LAB
ESTIMATED AVERAGE GLUCOSE: 160 MG/DL
HBA1C MFR BLD HPLC: 7.2 %
VIT B12 SERPL-MCNC: 1020 PG/ML

## 2020-02-23 NOTE — PHYSICAL EXAM
[de-identified] : The patient is ambulatory, well groomed, with no signs of cachexia. [de-identified] : Normocephalic, atraumatic [de-identified] : The neck is soft without edema, masses, or crepitus.  The trachea is midline.  \par There is no thyromegaly or palpable thyroid nodules. [de-identified] : The patient has no tachypnea or use of accessory respiratory muscles. [de-identified] : There are no masses, scars, or lesions of the external ear.  The external nose is midline with no scars or masses.  \par Gross hearing is intact bilaterally (finger rub).\par The nasal mucosa has no edema, lesions, or signs of desiccation. \par The lips and gums have no masses, lesions, friability or edema. [de-identified] : No conjuctival erythema, hyperemia, or discharge; No ectropion, entropion, lagophthalmos, or lid malposition\par Both pupils are equal, round, & reactive to light [de-identified] : The patient has normal gait and station.\par With inspection and palpation of digits and nails, there is no clubbing, ischemia, or infections noted.\par  [de-identified] : The extremities have no swelling, tenderness, or varicosities.  \par On palpation, the extremities are warm with palpable pedal pulses [de-identified] : Bilateral Earlobes - right lower hole with partial tear, left earlobe two piercing holes both partially torn\par no open areas\par \par Back - mid back subcutaneous mass w/ punctum noted, approx. 2cm in diameter,\par no open areas, no drainage, no erythema, non-tender, mobile, soft but firm\par no regional LAD\par  [de-identified] : There is no abdominal wall tenderness or masses with palpation.   \par No abdominal wall hernias are noted. [de-identified] : CN 2 - 12 are intact\par No sensory disturbances are noted (e.g., by touch) [de-identified] : The patient is alert and oriented to time, place, and person. \par There is no obvious disorder in mood or affect such as anxiety or agitation.

## 2020-02-23 NOTE — CONSULT LETTER
[Dear  ___] : Dear  [unfilled], [Please see my note below.] : Please see my note below. [Consult Letter:] : I had the pleasure of evaluating your patient, [unfilled]. [Sincerely,] : Sincerely, [FreeTextEntry3] : Patricio Wolff MD [Consult Closing:] : Thank you very much for allowing me to participate in the care of this patient.  If you have any questions, please do not hesitate to contact me.

## 2020-03-03 ENCOUNTER — APPOINTMENT (OUTPATIENT)
Dept: SURGERY | Facility: CLINIC | Age: 60
End: 2020-03-03
Payer: COMMERCIAL

## 2020-03-03 VITALS
SYSTOLIC BLOOD PRESSURE: 119 MMHG | HEART RATE: 59 BPM | RESPIRATION RATE: 16 BRPM | TEMPERATURE: 98.4 F | DIASTOLIC BLOOD PRESSURE: 82 MMHG

## 2020-03-03 PROCEDURE — 99213 OFFICE O/P EST LOW 20 MIN: CPT

## 2020-03-03 RX ORDER — OMEPRAZOLE 20 MG/1
20 CAPSULE, DELAYED RELEASE ORAL DAILY
Qty: 30 | Refills: 1 | Status: DISCONTINUED | COMMUNITY
Start: 2019-12-02 | End: 2020-03-03

## 2020-03-03 NOTE — HISTORY OF PRESENT ILLNESS
[de-identified] : The patient complains of persistent mild discomfort and drainage in the area of a right upper quadrant sinus tract.  The most recent CT demonstrates no obvious underlying abscess.

## 2020-03-03 NOTE — ASSESSMENT
[FreeTextEntry1] : In summary the patient has a chronically inflamed right upper quadrant sinus tract in the area of a previous drain placed following an open cholecystectomy.  This is been bothering her for many years.  I recommended local wound exploration.  I explained that she may require wound VAC if any chronic infection is encountered.  I explained the risks benefits and alternatives including the risks of bleeding infection and recurrence.

## 2020-03-05 ENCOUNTER — APPOINTMENT (OUTPATIENT)
Dept: PLASTIC SURGERY | Facility: CLINIC | Age: 60
End: 2020-03-05
Payer: COMMERCIAL

## 2020-03-05 ENCOUNTER — LABORATORY RESULT (OUTPATIENT)
Age: 60
End: 2020-03-05

## 2020-03-05 DIAGNOSIS — S01.319A LACERATION W/OUT FOREIGN BODY OF UNSPECIFIED EAR, INITIAL ENCOUNTER: ICD-10-CM

## 2020-03-05 DIAGNOSIS — L72.9 FOLLICULAR CYST OF THE SKIN AND SUBCUTANEOUS TISSUE, UNSPECIFIED: ICD-10-CM

## 2020-03-05 PROCEDURE — 14060 TIS TRNFR E/N/E/L 10 SQ CM/<: CPT

## 2020-03-05 PROCEDURE — 14000 TIS TRNFR TRUNK 10 SQ CM/<: CPT

## 2020-03-05 PROCEDURE — 21930 EXC BACK LES SC < 3 CM: CPT

## 2020-03-05 NOTE — PROCEDURE
[Nl] : None [FreeTextEntry1] : Back mass, and torn earlobes [FreeTextEntry2] : Excision of back mass 2cm, local advancement flap <1-sqcm. Bilateral repair of torn earlobes with local flaps <10sqcm [FreeTextEntry6] : After the area was prepped and draped, the area was infiltrated with 1%lidocaine with epi. The previous scar and mass was marked, and incision was made around it. The mass was dissected and the mass with overlying skin was removed. It measured 2cm.  This was sent to pathology.\par \par The wound was then irrigated, and given size and location of the wound, a local flap was marked. The flap was incised and elevated. The flap was then advanced into the area providing a good reconstruction. The flap was then inset with 3-0 monocryl for the dermis and 4-0 nylon for the skin. The total area including the wound was less than 10sq cm. A dressing was applied. \par \par Attention was turned to the earlobes. Skin incision were designed to create two rectangular flaps along the margin of the cleft between the two lobes: an anteriorly based medial flap and a posteriorly based lateral flap. After local infiltration of 1% xylocaine with skin incisions were made along the designed lines. Each flap was created by incising perpendicularly deep near to thee dermis on the other side and each flap was deepithelialised. In the lateral lobe, a posteriorly based deepithelialised flap was created. In the medial lobe, an anteriorly based deepithelialised flap was made. On the posterior surface of the medial lobe and the anterior surface of the lateral lobe,subcutaneous pockets to accommodate the deepithelialised flaps were formed, respectively. After insertion of the deepithelialised flaps into each pocket, two 6/0 vicryl sutures were placed to fix the flaps in the subcutaneous pockets. The skin edges on the anterior and posterior surface were repaired with 6/0 vicryl subcutaneous sutures and 6/0 prolene skin sutures. This was repeated on the other ealobe. \par The left earlobe had a second tear, which will repair at a later time. Patient tolerated well the procedure.\par  [FreeTextEntry7] : back mass

## 2020-03-05 NOTE — REASON FOR VISIT
[Procedure: _________] : a [unfilled] procedure visit [FreeTextEntry1] : Bilateral Earlobe Repair and Excisional Biopsy and Closure of Mid Back Mass.

## 2020-03-09 ENCOUNTER — OUTPATIENT (OUTPATIENT)
Dept: OUTPATIENT SERVICES | Facility: HOSPITAL | Age: 60
LOS: 1 days | End: 2020-03-09
Payer: COMMERCIAL

## 2020-03-09 VITALS
TEMPERATURE: 98 F | WEIGHT: 212.08 LBS | HEIGHT: 66 IN | SYSTOLIC BLOOD PRESSURE: 109 MMHG | DIASTOLIC BLOOD PRESSURE: 75 MMHG | RESPIRATION RATE: 16 BRPM | OXYGEN SATURATION: 98 % | HEART RATE: 66 BPM

## 2020-03-09 DIAGNOSIS — Z96.653 PRESENCE OF ARTIFICIAL KNEE JOINT, BILATERAL: Chronic | ICD-10-CM

## 2020-03-09 DIAGNOSIS — R22.2 LOCALIZED SWELLING, MASS AND LUMP, TRUNK: ICD-10-CM

## 2020-03-09 DIAGNOSIS — E11.9 TYPE 2 DIABETES MELLITUS WITHOUT COMPLICATIONS: ICD-10-CM

## 2020-03-09 DIAGNOSIS — Z01.818 ENCOUNTER FOR OTHER PREPROCEDURAL EXAMINATION: ICD-10-CM

## 2020-03-09 DIAGNOSIS — Z98.84 BARIATRIC SURGERY STATUS: Chronic | ICD-10-CM

## 2020-03-09 DIAGNOSIS — Z90.49 ACQUIRED ABSENCE OF OTHER SPECIFIED PARTS OF DIGESTIVE TRACT: Chronic | ICD-10-CM

## 2020-03-09 LAB
ANION GAP SERPL CALC-SCNC: 11 MMOL/L — SIGNIFICANT CHANGE UP (ref 5–17)
BUN SERPL-MCNC: 9 MG/DL — SIGNIFICANT CHANGE UP (ref 7–23)
CALCIUM SERPL-MCNC: 9.2 MG/DL — SIGNIFICANT CHANGE UP (ref 8.4–10.5)
CHLORIDE SERPL-SCNC: 103 MMOL/L — SIGNIFICANT CHANGE UP (ref 96–108)
CO2 SERPL-SCNC: 27 MMOL/L — SIGNIFICANT CHANGE UP (ref 22–31)
CREAT SERPL-MCNC: 0.57 MG/DL — SIGNIFICANT CHANGE UP (ref 0.5–1.3)
GLUCOSE SERPL-MCNC: 213 MG/DL — HIGH (ref 70–99)
HCT VFR BLD CALC: 38.2 % — SIGNIFICANT CHANGE UP (ref 34.5–45)
HGB BLD-MCNC: 11.4 G/DL — LOW (ref 11.5–15.5)
MCHC RBC-ENTMCNC: 27.9 PG — SIGNIFICANT CHANGE UP (ref 27–34)
MCHC RBC-ENTMCNC: 29.8 GM/DL — LOW (ref 32–36)
MCV RBC AUTO: 93.6 FL — SIGNIFICANT CHANGE UP (ref 80–100)
NRBC # BLD: 0 /100 WBCS — SIGNIFICANT CHANGE UP (ref 0–0)
PLATELET # BLD AUTO: 223 K/UL — SIGNIFICANT CHANGE UP (ref 150–400)
POTASSIUM SERPL-MCNC: 4.4 MMOL/L — SIGNIFICANT CHANGE UP (ref 3.5–5.3)
POTASSIUM SERPL-SCNC: 4.4 MMOL/L — SIGNIFICANT CHANGE UP (ref 3.5–5.3)
RBC # BLD: 4.08 M/UL — SIGNIFICANT CHANGE UP (ref 3.8–5.2)
RBC # FLD: 14.2 % — SIGNIFICANT CHANGE UP (ref 10.3–14.5)
SODIUM SERPL-SCNC: 141 MMOL/L — SIGNIFICANT CHANGE UP (ref 135–145)
WBC # BLD: 4.79 K/UL — SIGNIFICANT CHANGE UP (ref 3.8–10.5)
WBC # FLD AUTO: 4.79 K/UL — SIGNIFICANT CHANGE UP (ref 3.8–10.5)

## 2020-03-09 PROCEDURE — 85027 COMPLETE CBC AUTOMATED: CPT

## 2020-03-09 PROCEDURE — G0463: CPT

## 2020-03-09 PROCEDURE — 80048 BASIC METABOLIC PNL TOTAL CA: CPT

## 2020-03-09 RX ORDER — MULTIVIT-MIN/FERROUS GLUCONATE 9 MG/15 ML
1 LIQUID (ML) ORAL
Qty: 0 | Refills: 0 | DISCHARGE

## 2020-03-09 RX ORDER — ROSUVASTATIN CALCIUM 5 MG/1
1 TABLET ORAL
Qty: 0 | Refills: 0 | DISCHARGE

## 2020-03-09 RX ORDER — METFORMIN HYDROCHLORIDE 850 MG/1
1 TABLET ORAL
Qty: 0 | Refills: 0 | DISCHARGE

## 2020-03-09 NOTE — H&P PST ADULT - BREASTS
Please ask the patient how her blood pressure was in subacute rehabilitation. If it was okay without lisinopril she may hold the lisinopril until she sees me in follow-up on March 27. If her blood pressure has been high then we should restart lisinopril. not examined

## 2020-03-09 NOTE — H&P PST ADULT - NSICDXPROBLEM_GEN_ALL_CORE_FT
PROBLEM DIAGNOSES  Problem: Localized swelling of abdominal wall  Assessment and Plan: Resection of abominal wall mass, possible wound vac placement  Labs: CBC, BMP,   Preop insructions discussed      Problem: Type 2 diabetes mellitus  Assessment and Plan: Finger Stick BS on DOS

## 2020-03-09 NOTE — H&P PST ADULT - HISTORY OF PRESENT ILLNESS
after gall stone surgery developed in 1990 is beth developed 59 y.o female PMH DMII, HLD, s/p Gastric bypass (2004 lost 100 lbs) c/o RLQ abdominal mass since 1990 after cholecystectomy in [facility]. Patient has surgical consult and abdominal US revealed localized swelling and fluid of mass. Patient is scheduled for re-section of abdominal wall mass, possible wound va placement on 3/16/2020. Denies pain, fever chills, or recent travel.

## 2020-03-09 NOTE — H&P PST ADULT - NSANTHOSAYNRD_GEN_A_CORE
No. GERSON screening performed.  STOP BANG Legend: 0-2 = LOW Risk; 3-4 = INTERMEDIATE Risk; 5-8 = HIGH Risk

## 2020-03-09 NOTE — H&P PST ADULT - NSICDXPASTSURGICALHX_GEN_ALL_CORE_FT
PAST SURGICAL HISTORY:  S/P cholecystectomy 1990    S/P gastric bypass 2004    S/P total knee arthroplasty, bilateral 2009

## 2020-03-16 ENCOUNTER — APPOINTMENT (OUTPATIENT)
Dept: SURGERY | Facility: HOSPITAL | Age: 60
End: 2020-03-16

## 2020-03-26 ENCOUNTER — APPOINTMENT (OUTPATIENT)
Dept: PLASTIC SURGERY | Facility: CLINIC | Age: 60
End: 2020-03-26

## 2020-05-19 ENCOUNTER — APPOINTMENT (OUTPATIENT)
Dept: INTERNAL MEDICINE | Facility: CLINIC | Age: 60
End: 2020-05-19
Payer: COMMERCIAL

## 2020-05-19 PROBLEM — E78.5 HYPERLIPIDEMIA, UNSPECIFIED: Chronic | Status: ACTIVE | Noted: 2020-03-09

## 2020-05-19 PROBLEM — E11.9 TYPE 2 DIABETES MELLITUS WITHOUT COMPLICATIONS: Chronic | Status: ACTIVE | Noted: 2020-03-09

## 2020-05-19 PROCEDURE — 99214 OFFICE O/P EST MOD 30 MIN: CPT | Mod: 95

## 2020-05-19 RX ORDER — BLOOD SUGAR DIAGNOSTIC
STRIP MISCELLANEOUS TWICE DAILY
Qty: 1 | Refills: 11 | Status: ACTIVE | COMMUNITY
Start: 2020-05-19 | End: 1900-01-01

## 2020-05-19 NOTE — PLAN
[FreeTextEntry1] : DM, chol,obesity- compliance w diet reviewed\par 25   minutes minimal was spent with patient and >50% of the time spent in the encounter involved counseling and coordination of care for any and all diagnosis submitted.\par \par

## 2020-05-19 NOTE — HISTORY OF PRESENT ILLNESS
[Home] : at home, [unfilled] , at the time of the visit. [Other Location: e.g. Home (Enter Location, City,State)___] : at [unfilled] [Patient] : the patient [FreeTextEntry1] : DM\par  [de-identified] : DM- compliant w meds, diet.  no CP or SOB.  pt is gardening and walking her dog.\par cheating on diet.  baking cake, ice cream \par fasting glucose 121\par lipid- compliant w meds, occasional non compliant w diet\par GERD-no symptoms. takes  omeprazole 40 rarely - only if she gets nausea w fatty diet\par pt f/u w Ortho for knee pain\par obesity- reviewed w diet

## 2020-05-20 ENCOUNTER — LABORATORY RESULT (OUTPATIENT)
Age: 60
End: 2020-05-20

## 2020-05-21 DIAGNOSIS — U07.1 COVID-19: ICD-10-CM

## 2020-07-07 ENCOUNTER — APPOINTMENT (OUTPATIENT)
Dept: INTERNAL MEDICINE | Facility: CLINIC | Age: 60
End: 2020-07-07

## 2020-11-24 ENCOUNTER — APPOINTMENT (OUTPATIENT)
Dept: INTERNAL MEDICINE | Facility: CLINIC | Age: 60
End: 2020-11-24
Payer: COMMERCIAL

## 2020-11-24 VITALS
HEIGHT: 67 IN | WEIGHT: 215 LBS | BODY MASS INDEX: 33.74 KG/M2 | DIASTOLIC BLOOD PRESSURE: 78 MMHG | TEMPERATURE: 97.6 F | SYSTOLIC BLOOD PRESSURE: 110 MMHG | OXYGEN SATURATION: 97 % | HEART RATE: 65 BPM

## 2020-11-24 DIAGNOSIS — Z11.59 ENCOUNTER FOR SCREENING FOR OTHER VIRAL DISEASES: ICD-10-CM

## 2020-11-24 DIAGNOSIS — T14.8XXA OTHER INJURY OF UNSPECIFIED BODY REGION, INITIAL ENCOUNTER: ICD-10-CM

## 2020-11-24 DIAGNOSIS — R19.00 INTRA-ABDOMINAL AND PELVIC SWELLING, MASS AND LUMP, UNSPECIFIED SITE: ICD-10-CM

## 2020-11-24 DIAGNOSIS — G89.29 PAIN IN UNSPECIFIED KNEE: ICD-10-CM

## 2020-11-24 DIAGNOSIS — R11.0 NAUSEA: ICD-10-CM

## 2020-11-24 DIAGNOSIS — Z12.31 ENCOUNTER FOR SCREENING MAMMOGRAM FOR MALIGNANT NEOPLASM OF BREAST: ICD-10-CM

## 2020-11-24 DIAGNOSIS — Z00.00 ENCOUNTER FOR GENERAL ADULT MEDICAL EXAMINATION W/OUT ABNORMAL FINDINGS: ICD-10-CM

## 2020-11-24 DIAGNOSIS — E66.9 OBESITY, UNSPECIFIED: ICD-10-CM

## 2020-11-24 DIAGNOSIS — R22.2 LOCALIZED SWELLING, MASS AND LUMP, TRUNK: ICD-10-CM

## 2020-11-24 DIAGNOSIS — M25.569 PAIN IN UNSPECIFIED KNEE: ICD-10-CM

## 2020-11-24 DIAGNOSIS — Z98.84 BARIATRIC SURGERY STATUS: ICD-10-CM

## 2020-11-24 DIAGNOSIS — Z23 ENCOUNTER FOR IMMUNIZATION: ICD-10-CM

## 2020-11-24 DIAGNOSIS — R74.8 ABNORMAL LEVELS OF OTHER SERUM ENZYMES: ICD-10-CM

## 2020-11-24 DIAGNOSIS — R79.89 OTHER SPECIFIED ABNORMAL FINDINGS OF BLOOD CHEMISTRY: ICD-10-CM

## 2020-11-24 LAB — CYTOLOGY CVX/VAG DOC THIN PREP: NORMAL

## 2020-11-24 PROCEDURE — 36415 COLL VENOUS BLD VENIPUNCTURE: CPT

## 2020-11-24 PROCEDURE — 99396 PREV VISIT EST AGE 40-64: CPT | Mod: 25

## 2020-11-24 RX ORDER — OMEPRAZOLE 40 MG/1
40 CAPSULE, DELAYED RELEASE ORAL
Qty: 1 | Refills: 0 | Status: ACTIVE | COMMUNITY
Start: 2020-11-24 | End: 1900-01-01

## 2020-11-24 NOTE — HISTORY OF PRESENT ILLNESS
[FreeTextEntry1] : CPE [de-identified] : vaccine- pt doesn't want vaccines today.  discussed flu,shingrix, tdap vac\par diet- reviewed w pt\par exercise- walking\par DM- non compliant w diet. compliant w meds.   fasting gluc 112, 109.  \par chol-non compliant w diet.  compliant w meds\par 12/19- CT-  focus of inflamm in subcut fat- pt was seen by Dr. Dickinson who rec surgery\par nausea in the AM when she eats greasy food.  has occasional bloating. no diarrhea\par itching, burning sensation L upper back- started a few yr. - 2 times/ day.  lasts until she itches her back

## 2020-11-24 NOTE — PHYSICAL EXAM
[No Acute Distress] : no acute distress [Well Nourished] : well nourished [Well Developed] : well developed [Well-Appearing] : well-appearing [Normal Sclera/Conjunctiva] : normal sclera/conjunctiva [PERRL] : pupils equal round and reactive to light [Normal Outer Ear/Nose] : the outer ears and nose were normal in appearance [Normal TMs] : both tympanic membranes were normal [No Lymphadenopathy] : no lymphadenopathy [Supple] : supple [Thyroid Normal, No Nodules] : the thyroid was normal and there were no nodules present [No Respiratory Distress] : no respiratory distress  [No Accessory Muscle Use] : no accessory muscle use [Clear to Auscultation] : lungs were clear to auscultation bilaterally [Normal Rate] : normal rate  [Regular Rhythm] : with a regular rhythm [Normal S1, S2] : normal S1 and S2 [No Murmur] : no murmur heard [No Carotid Bruits] : no carotid bruits [Pedal Pulses Present] : the pedal pulses are present [No Edema] : there was no peripheral edema [Soft] : abdomen soft [Non Tender] : non-tender [Non-distended] : non-distended [No Masses] : no abdominal mass palpated [Normal Bowel Sounds] : normal bowel sounds [Normal Supraclavicular Nodes] : no supraclavicular lymphadenopathy [Normal Axillary Nodes] : no axillary lymphadenopathy [Normal Posterior Cervical Nodes] : no posterior cervical lymphadenopathy [Normal Anterior Cervical Nodes] : no anterior cervical lymphadenopathy [Normal Inguinal Nodes] : no inguinal lymphadenopathy [Grossly Normal Strength/Tone] : grossly normal strength/tone [No Focal Deficits] : no focal deficits [Normal Gait] : normal gait [Normal Affect] : the affect was normal [Normal Insight/Judgement] : insight and judgment were intact [Right Foot Was Examined] : Right foot ~C was examined [Left Foot Was Examined] : left foot ~C was examined [None] : no ulcers in either foot were found [] : normal [TWNoteComboBox5] : +2 [TWNoteComboBox6] : +2

## 2020-11-24 NOTE — HEALTH RISK ASSESSMENT
[Patient reported colonoscopy was abnormal] : Patient reported colonoscopy was abnormal [Never (0 pts)] : Never (0 points) [No] : In the past 12 months have you used drugs other than those required for medical reasons? No [0] : 2) Feeling down, depressed, or hopeless: Not at all (0) [Patient reported mammogram was normal] : Patient reported mammogram was normal [Patient reported PAP Smear was normal] : Patient reported PAP Smear was normal [HIV test declined] : HIV test declined [Hepatitis C test declined] : Hepatitis C test declined [] : No [Audit-CScore] : 0 [MammogramDate] : 2018 [PapSmearDate] : 08/20 [PapSmearComments] : breast exam was done by GYN [ColonoscopyDate] : 05/19 [ColonoscopyComments] : polyp [de-identified] : wears glasses.  seen ophth 10 mo ago [de-identified] : seen dentist 2 yr ago [With Patient/Caregiver] : With Patient/Caregiver [Name: ___] : Health Care Proxy's Name: [unfilled]  [Relationship: ___] : Relationship: [unfilled] [AdvancecareDate] : 11/20

## 2020-11-24 NOTE — PLAN
[FreeTextEntry1] : unable to do EKG- EKG machine not working\par nausea- lifestyle chg for GERD discussed.  start prilosec

## 2020-11-25 LAB
ALBUMIN SERPL ELPH-MCNC: 4.3 G/DL
ALP BLD-CCNC: 74 U/L
ALT SERPL-CCNC: 39 U/L
ANION GAP SERPL CALC-SCNC: 9 MMOL/L
AST SERPL-CCNC: 30 U/L
BASOPHILS # BLD AUTO: 0.04 K/UL
BASOPHILS NFR BLD AUTO: 0.7 %
BILIRUB SERPL-MCNC: 0.6 MG/DL
BUN SERPL-MCNC: 13 MG/DL
CALCIUM SERPL-MCNC: 9.9 MG/DL
CHLORIDE SERPL-SCNC: 100 MMOL/L
CHOLEST SERPL-MCNC: 177 MG/DL
CO2 SERPL-SCNC: 31 MMOL/L
CREAT SERPL-MCNC: 0.62 MG/DL
CREAT SPEC-SCNC: 128 MG/DL
EOSINOPHIL # BLD AUTO: 0.24 K/UL
EOSINOPHIL NFR BLD AUTO: 4.2 %
ESTIMATED AVERAGE GLUCOSE: 163 MG/DL
GLUCOSE SERPL-MCNC: 116 MG/DL
HBA1C MFR BLD HPLC: 7.3 %
HCT VFR BLD CALC: 38.2 %
HDLC SERPL-MCNC: 88 MG/DL
HGB BLD-MCNC: 11.5 G/DL
IMM GRANULOCYTES NFR BLD AUTO: 0.2 %
LDLC SERPL CALC-MCNC: 79 MG/DL
LDLC SERPL DIRECT ASSAY-MCNC: 76 MG/DL
LYMPHOCYTES # BLD AUTO: 2.53 K/UL
LYMPHOCYTES NFR BLD AUTO: 44.5 %
MAN DIFF?: NORMAL
MCHC RBC-ENTMCNC: 28 PG
MCHC RBC-ENTMCNC: 30.1 GM/DL
MCV RBC AUTO: 93.2 FL
MICROALBUMIN 24H UR DL<=1MG/L-MCNC: <1.2 MG/DL
MICROALBUMIN/CREAT 24H UR-RTO: NORMAL MG/G
MONOCYTES # BLD AUTO: 0.54 K/UL
MONOCYTES NFR BLD AUTO: 9.5 %
NEUTROPHILS # BLD AUTO: 2.32 K/UL
NEUTROPHILS NFR BLD AUTO: 40.9 %
NONHDLC SERPL-MCNC: 88 MG/DL
PLATELET # BLD AUTO: 257 K/UL
POTASSIUM SERPL-SCNC: 4.4 MMOL/L
PROT SERPL-MCNC: 7.2 G/DL
RBC # BLD: 4.1 M/UL
RBC # FLD: 14.4 %
SAVE SPECIMEN: NORMAL
SODIUM SERPL-SCNC: 140 MMOL/L
T4 FREE SERPL-MCNC: 0.9 NG/DL
TRIGL SERPL-MCNC: 45 MG/DL
TSH SERPL-ACNC: 2.41 UIU/ML
WBC # FLD AUTO: 5.68 K/UL

## 2020-12-02 ENCOUNTER — NON-APPOINTMENT (OUTPATIENT)
Age: 60
End: 2020-12-02

## 2021-03-26 ENCOUNTER — NON-APPOINTMENT (OUTPATIENT)
Age: 61
End: 2021-03-26

## 2021-03-26 ENCOUNTER — APPOINTMENT (OUTPATIENT)
Dept: INTERNAL MEDICINE | Facility: CLINIC | Age: 61
End: 2021-03-26
Payer: COMMERCIAL

## 2021-03-26 VITALS
HEIGHT: 67 IN | DIASTOLIC BLOOD PRESSURE: 76 MMHG | TEMPERATURE: 97 F | OXYGEN SATURATION: 97 % | HEART RATE: 76 BPM | BODY MASS INDEX: 33.12 KG/M2 | WEIGHT: 211 LBS | SYSTOLIC BLOOD PRESSURE: 106 MMHG

## 2021-03-26 DIAGNOSIS — R53.83 OTHER FATIGUE: ICD-10-CM

## 2021-03-26 DIAGNOSIS — F41.9 ANXIETY DISORDER, UNSPECIFIED: ICD-10-CM

## 2021-03-26 DIAGNOSIS — E11.9 TYPE 2 DIABETES MELLITUS W/OUT COMPLICATIONS: ICD-10-CM

## 2021-03-26 DIAGNOSIS — Z86.16 PERSONAL HISTORY OF COVID-19: ICD-10-CM

## 2021-03-26 DIAGNOSIS — E78.5 HYPERLIPIDEMIA, UNSPECIFIED: ICD-10-CM

## 2021-03-26 DIAGNOSIS — L29.9 PRURITUS, UNSPECIFIED: ICD-10-CM

## 2021-03-26 PROCEDURE — 93000 ELECTROCARDIOGRAM COMPLETE: CPT

## 2021-03-26 PROCEDURE — 99214 OFFICE O/P EST MOD 30 MIN: CPT | Mod: 25

## 2021-03-26 PROCEDURE — 99072 ADDL SUPL MATRL&STAF TM PHE: CPT

## 2021-03-26 PROCEDURE — 36415 COLL VENOUS BLD VENIPUNCTURE: CPT

## 2021-03-26 NOTE — HISTORY OF PRESENT ILLNESS
[FreeTextEntry1] : DM [de-identified] : \par DM- occasionally non compliant w diet- eating sponge cake. compliant w meds.   fasting gluc 118,112.  post prandial 151.  no hypoglycemic episodes\par chol- non compliant w diet.  non compliant w meds\par 12/19- CT-  focus of inflamm in subcut fat- pt was seen by Dr. Dickinson who rec surgery.  states she took a suture out -n ( surg 1990- in [facility])and then the area healed\par nausea in the AM when she eats greasy food.  has occasional bloating. no diarrhea- pt was started on PPI 11/20- pt didn't take the medication.  pt states nausea is only w greasy foods. \par stressed by work\par palpitation- more than 1 mo.  occurs when she is busy at work.  occurs 3-4 days/ wk.  duration 2-3 hr.  no CP or SOB\par pt c/o itchy back .  using moisturizer\par pt states since she had COVID- 4/20- she has cont to be tired\par

## 2021-03-26 NOTE — PHYSICAL EXAM
[Right Foot Was Examined] : Right foot ~C was examined [Left Foot Was Examined] : left foot ~C was examined [] : normal [TWNoteComboBox5] : +2 [TWNoteComboBox6] : +2

## 2021-03-30 LAB
25(OH)D3 SERPL-MCNC: 34.2 NG/ML
ALBUMIN SERPL ELPH-MCNC: 4.1 G/DL
ALP BLD-CCNC: 69 U/L
ALT SERPL-CCNC: 20 U/L
ANION GAP SERPL CALC-SCNC: 5 MMOL/L
AST SERPL-CCNC: 28 U/L
BILIRUB SERPL-MCNC: 0.6 MG/DL
BUN SERPL-MCNC: 22 MG/DL
CALCIUM SERPL-MCNC: 9.3 MG/DL
CHLORIDE SERPL-SCNC: 106 MMOL/L
CO2 SERPL-SCNC: 26 MMOL/L
CREAT SERPL-MCNC: 0.8 MG/DL
GLUCOSE SERPL-MCNC: 138 MG/DL
POTASSIUM SERPL-SCNC: 4.2 MMOL/L
PROT SERPL-MCNC: 7.1 G/DL
SAVE SPECIMEN: NORMAL
SODIUM SERPL-SCNC: 137 MMOL/L
T3 SERPL-MCNC: 78 NG/DL
T4 FREE SERPL-MCNC: 0.9 NG/DL
TSH SERPL-ACNC: 1.75 UIU/ML

## 2021-04-07 ENCOUNTER — OUTPATIENT (OUTPATIENT)
Dept: OUTPATIENT SERVICES | Facility: HOSPITAL | Age: 61
LOS: 1 days | End: 2021-04-07

## 2021-04-07 ENCOUNTER — APPOINTMENT (OUTPATIENT)
Dept: PLASTIC SURGERY | Facility: CLINIC | Age: 61
End: 2021-04-07

## 2021-04-07 VITALS — BODY MASS INDEX: 32.89 KG/M2 | WEIGHT: 210 LBS

## 2021-04-07 DIAGNOSIS — Z96.653 PRESENCE OF ARTIFICIAL KNEE JOINT, BILATERAL: Chronic | ICD-10-CM

## 2021-04-07 DIAGNOSIS — Z98.84 BARIATRIC SURGERY STATUS: Chronic | ICD-10-CM

## 2021-04-07 DIAGNOSIS — Z90.49 ACQUIRED ABSENCE OF OTHER SPECIFIED PARTS OF DIGESTIVE TRACT: Chronic | ICD-10-CM

## 2021-04-07 NOTE — HISTORY OF PRESENT ILLNESS
[FreeTextEntry1] : Ms. Huerta presents to discuss mommy makeover.  She is interested in breast lift as well as abdominoplasty.  Of note she has had multiple previous consultations for this.  Her case is complicated by previous remote gall bladder surgery with large right subcostal incision.  She also has history of bariatric surgery in 2004 done through midline supraumbilical incision.\par \par PMH significant for diabetes on metformin.  PSH also significant for hip and knee replacements.  No other meds or allergies.  Quit smoking 35 years ago.  No history of blood clots.  \par \par On exam she has large right subcostal scar.  Midline supraumbilical scar.  Significant excess abdominal skin and fat.  Breasts large and penduluous, grade 2/3 ptosis.  BMI 33+\par \par Discussed with patient that she is very high risk for abdominoplasty given both her previous surgeries /scars as well as her diabetes and BMI.  Recommended that she lose weight and get diabetes in better control (reports A1c is around 7) before even considering this surgery.  She understands and will contact us when she is moving in this direction.

## 2021-04-09 LAB
BASOPHILS # BLD AUTO: 0.03 K/UL
BASOPHILS NFR BLD AUTO: 0.5 %
EOSINOPHIL # BLD AUTO: 0.25 K/UL
EOSINOPHIL NFR BLD AUTO: 4.2 %
ESTIMATED AVERAGE GLUCOSE: 154 MG/DL
HBA1C MFR BLD HPLC: 7 %
HCT VFR BLD CALC: 41.2 %
HGB BLD-MCNC: 12.6 G/DL
IMM GRANULOCYTES NFR BLD AUTO: 0.2 %
LYMPHOCYTES # BLD AUTO: 2.22 K/UL
LYMPHOCYTES NFR BLD AUTO: 36.9 %
MAN DIFF?: NORMAL
MCHC RBC-ENTMCNC: 28.4 PG
MCHC RBC-ENTMCNC: 30.6 GM/DL
MCV RBC AUTO: 93 FL
MONOCYTES # BLD AUTO: 0.54 K/UL
MONOCYTES NFR BLD AUTO: 9 %
NEUTROPHILS # BLD AUTO: 2.97 K/UL
NEUTROPHILS NFR BLD AUTO: 49.2 %
PLATELET # BLD AUTO: 243 K/UL
RBC # BLD: 4.43 M/UL
RBC # FLD: 13.8 %
WBC # FLD AUTO: 6.02 K/UL

## 2021-04-15 DIAGNOSIS — Z01.89 ENCOUNTER FOR OTHER SPECIFIED SPECIAL EXAMINATIONS: ICD-10-CM

## 2021-04-20 ENCOUNTER — NON-APPOINTMENT (OUTPATIENT)
Age: 61
End: 2021-04-20

## 2021-05-28 RX ORDER — ROSUVASTATIN CALCIUM 20 MG/1
20 TABLET, FILM COATED ORAL DAILY
Qty: 90 | Refills: 1 | Status: ACTIVE | COMMUNITY
Start: 1900-01-01 | End: 1900-01-01

## 2021-09-09 RX ORDER — METFORMIN HYDROCHLORIDE 500 MG/1
500 TABLET, COATED ORAL
Qty: 30 | Refills: 0 | Status: ACTIVE | COMMUNITY
Start: 1900-01-01 | End: 1900-01-01

## 2021-10-26 ENCOUNTER — APPOINTMENT (OUTPATIENT)
Dept: INTERNAL MEDICINE | Facility: CLINIC | Age: 61
End: 2021-10-26

## 2022-03-23 ENCOUNTER — APPOINTMENT (OUTPATIENT)
Dept: PLASTIC SURGERY | Facility: CLINIC | Age: 62
End: 2022-03-23

## 2022-03-23 ENCOUNTER — OUTPATIENT (OUTPATIENT)
Dept: OUTPATIENT SERVICES | Facility: HOSPITAL | Age: 62
LOS: 1 days | End: 2022-03-23

## 2022-03-23 VITALS — BODY MASS INDEX: 31.01 KG/M2 | WEIGHT: 198 LBS

## 2022-03-23 VITALS — WEIGHT: 198 LBS | BODY MASS INDEX: 31.01 KG/M2

## 2022-03-23 DIAGNOSIS — Z90.49 ACQUIRED ABSENCE OF OTHER SPECIFIED PARTS OF DIGESTIVE TRACT: Chronic | ICD-10-CM

## 2022-03-23 DIAGNOSIS — Z96.653 PRESENCE OF ARTIFICIAL KNEE JOINT, BILATERAL: Chronic | ICD-10-CM

## 2022-03-23 DIAGNOSIS — Z98.84 BARIATRIC SURGERY STATUS: Chronic | ICD-10-CM

## 2022-03-24 DIAGNOSIS — Z09 ENCOUNTER FOR FOLLOW-UP EXAMINATION AFTER COMPLETED TREATMENT FOR CONDITIONS OTHER THAN MALIGNANT NEOPLASM: ICD-10-CM

## 2022-04-11 PROBLEM — Z11.59 SCREENING FOR VIRAL DISEASE: Status: RESOLVED | Noted: 2020-05-19 | Resolved: 2020-11-24

## 2022-05-18 ENCOUNTER — OUTPATIENT (OUTPATIENT)
Dept: OUTPATIENT SERVICES | Facility: HOSPITAL | Age: 62
LOS: 1 days | End: 2022-05-18

## 2022-05-18 ENCOUNTER — APPOINTMENT (OUTPATIENT)
Dept: PLASTIC SURGERY | Facility: CLINIC | Age: 62
End: 2022-05-18

## 2022-05-18 DIAGNOSIS — Z98.84 BARIATRIC SURGERY STATUS: Chronic | ICD-10-CM

## 2022-05-18 DIAGNOSIS — Z90.49 ACQUIRED ABSENCE OF OTHER SPECIFIED PARTS OF DIGESTIVE TRACT: Chronic | ICD-10-CM

## 2022-05-18 DIAGNOSIS — Z96.653 PRESENCE OF ARTIFICIAL KNEE JOINT, BILATERAL: Chronic | ICD-10-CM

## 2022-05-20 DIAGNOSIS — Z01.818 ENCOUNTER FOR OTHER PREPROCEDURAL EXAMINATION: ICD-10-CM

## 2022-06-16 ENCOUNTER — APPOINTMENT (OUTPATIENT)
Dept: PLASTIC SURGERY | Facility: CLINIC | Age: 62
End: 2022-06-16

## 2022-06-16 VITALS
DIASTOLIC BLOOD PRESSURE: 82 MMHG | HEART RATE: 67 BPM | BODY MASS INDEX: 29.51 KG/M2 | OXYGEN SATURATION: 100 % | WEIGHT: 188 LBS | HEIGHT: 67 IN | SYSTOLIC BLOOD PRESSURE: 122 MMHG | TEMPERATURE: 97.6 F

## 2022-06-16 PROCEDURE — XXXXX: CPT | Mod: 1L

## 2022-10-19 ENCOUNTER — OUTPATIENT (OUTPATIENT)
Dept: OUTPATIENT SERVICES | Facility: HOSPITAL | Age: 62
LOS: 1 days | End: 2022-10-19

## 2022-10-19 ENCOUNTER — APPOINTMENT (OUTPATIENT)
Dept: PLASTIC SURGERY | Facility: CLINIC | Age: 62
End: 2022-10-19

## 2022-10-19 DIAGNOSIS — Z98.84 BARIATRIC SURGERY STATUS: Chronic | ICD-10-CM

## 2022-10-19 DIAGNOSIS — Z90.49 ACQUIRED ABSENCE OF OTHER SPECIFIED PARTS OF DIGESTIVE TRACT: Chronic | ICD-10-CM

## 2022-10-19 DIAGNOSIS — Z96.653 PRESENCE OF ARTIFICIAL KNEE JOINT, BILATERAL: Chronic | ICD-10-CM

## 2022-10-20 DIAGNOSIS — Z09 ENCOUNTER FOR FOLLOW-UP EXAMINATION AFTER COMPLETED TREATMENT FOR CONDITIONS OTHER THAN MALIGNANT NEOPLASM: ICD-10-CM

## 2023-06-05 ENCOUNTER — APPOINTMENT (OUTPATIENT)
Dept: OPHTHALMOLOGY | Facility: CLINIC | Age: 63
End: 2023-06-05

## 2023-06-19 ENCOUNTER — APPOINTMENT (OUTPATIENT)
Dept: OBGYN | Facility: CLINIC | Age: 63
End: 2023-06-19

## 2023-12-31 PROBLEM — Z23 NEED FOR 23-POLYVALENT PNEUMOCOCCAL POLYSACCHARIDE VACCINE: Status: RESOLVED | Noted: 2020-02-11 | Resolved: 2020-11-24

## 2024-04-30 ENCOUNTER — EMERGENCY (EMERGENCY)
Facility: HOSPITAL | Age: 64
LOS: 1 days | Discharge: HOME | End: 2024-04-30
Attending: EMERGENCY MEDICINE
Payer: MEDICARE

## 2024-04-30 VITALS
DIASTOLIC BLOOD PRESSURE: 73 MMHG | TEMPERATURE: 98 F | RESPIRATION RATE: 18 BRPM | HEIGHT: 67 IN | OXYGEN SATURATION: 100 % | HEART RATE: 62 BPM | SYSTOLIC BLOOD PRESSURE: 140 MMHG | WEIGHT: 194.01 LBS

## 2024-04-30 DIAGNOSIS — Z98.84 BARIATRIC SURGERY STATUS: Chronic | ICD-10-CM

## 2024-04-30 DIAGNOSIS — Z90.49 ACQUIRED ABSENCE OF OTHER SPECIFIED PARTS OF DIGESTIVE TRACT: Chronic | ICD-10-CM

## 2024-04-30 DIAGNOSIS — Z96.653 PRESENCE OF ARTIFICIAL KNEE JOINT, BILATERAL: Chronic | ICD-10-CM

## 2024-04-30 PROCEDURE — 99285 EMERGENCY DEPT VISIT HI MDM: CPT

## 2024-04-30 NOTE — ED ADULT NURSE NOTE - NSFALLRISKASMT_ED_ALL_ED_DT
5/4/2018    Chief Complaint: suicide attempt    Subjective:  Patient is a 22 y.o. female who was hospitalized for suicide attempt.    She notes that she tolerated the seroquel last night.  She notes that it was not too sedating.  She notes it took her a bit to fall asleep due to anxiety at night.  She notes panic attack this morning.    Objective     Vital Signs    Temp:  [97.4 °F (36.3 °C)-98.2 °F (36.8 °C)] 97.4 °F (36.3 °C)  Heart Rate:  [80-86] 86  Resp:  [18-20] 20  BP: (121-129)/(75-82) 129/75    Physical Exam:   General Appearance: alert, appears stated age and cooperative,  Hygiene:   good  Gait & Station: Normal  Musculoskeletal: No tremors or abnormal involuntary movements    Mental Status Exam:   Cooperation:  Cooperative  Eye Contact:  Good  Psychomotor Behavior:  Appropriate  Mood: Anxious/Nervous  Affect:  normal  Speech:  Normal  Thought Process:  Coherent  Associations: Goal Directed  Thought Content:     Normal   Suicidal:  None   Homicidal:  None   Hallucinations:  None   Delusion:  None  Cognitive Functioning:   Consciousness: awake, alert and oriented  Reliability:  good  Insight:  Fair  Judgement:  Fair  Impulse Control:  Fair    Lab Results (last 24 hours)     ** No results found for the last 24 hours. **        Imaging Results (last 24 hours)     ** No results found for the last 24 hours. **          Medicine:   Current Facility-Administered Medications:   •  acetaminophen (TYLENOL) tablet 650 mg, 650 mg, Oral, Q4H PRN, Mishel Barr MD, 650 mg at 05/03/18 1407  •  aluminum-magnesium hydroxide-simethicone (MAALOX MAX) 400-400-40 MG/5ML suspension 15 mL, 15 mL, Oral, Q6H PRN, Mishel Barr MD  •  cetirizine (zyrTEC) tablet 10 mg, 10 mg, Oral, Daily, Mishel Barr MD, 10 mg at 05/04/18 0852  •  famotidine (PEPCID) tablet 20 mg, 20 mg, Oral, BID, Mishel Barr MD, 20 mg at 05/04/18 0852  •  hydrOXYzine (VISTARIL) capsule 50 mg, 50 mg, Oral, Q6H PRN, Mishel Barr MD, 50  mg at 05/04/18 0851  •  loperamide (IMODIUM) capsule 2 mg, 2 mg, Oral, 4x Daily PRN, Mishel Barr MD  •  magnesium hydroxide (MILK OF MAGNESIA) suspension 2400 mg/10mL 10 mL, 10 mL, Oral, Daily PRN, Mishel Barr MD  •  ondansetron (ZOFRAN) tablet 4 mg, 4 mg, Oral, Q6H PRN, Mishel Barr MD  •  QUEtiapine (SEROquel) tablet 200 mg, 200 mg, Oral, Nightly, Mishel Barr MD, 200 mg at 05/03/18 2015    Diagnoses/Assessment:   Principal Problem:    Bipolar 1 disorder, depressed, severe  Active Problems:    Suicide attempt by drug ingestion      Treatment Plan:    1) Will continue care for the patient on the behavioral health unit at [facility] to ensure patient safety.  2) Will continue to provide treatment with the unit milieu, activities, therapies and psychopharmacological management.  3) Patient to be placed on or continued on  Q15 minute checks  and Suicide precautions.  4) Pertinent medical issues: Allegries.  Will cont zyrtec.  5) Will order following labs: none  6) Will make the following medication changes: Will increase the seroquel to 300mg qhs.  Will stop the vistaril and start seroquel 25mg bid prn.   7) Will continue discharge planning as appropriate for patient.  8) Psychotherapy provided for less than 16 minutes.     Treatment plan and medication risks and benefits discussed with: Patient     Mishel Barr MD  05/04/18  2:58 PM   01-May-2024 01:52

## 2024-04-30 NOTE — ED ADULT NURSE NOTE - OBJECTIVE STATEMENT
62 y/o female presents to the ED endorsing intermittent dyspnea x1 month and palpitations x1 week. A/Ox4. Ambulatory without assistive devices at baseline. PMH: T2DM and HLD. Patient endorses visits to her PCP where she had "an ekg and bloodwork". Patient endorses having a holter monitor placed by her PCP. Patient endorses at 9am she received a call from her PCP for "4 beats of VTACH". Patient endorses never seeing a cardiologist. Patient denies chest pain, fever, chills, nausea, vomiting and diarrhea. Patient on CM, NSR. Safety and comfort provided.

## 2024-04-30 NOTE — ED ADULT TRIAGE NOTE - CHIEF COMPLAINT QUOTE
intermittent shortness of breath and palpitations x 1 month.  Worse with activity.   Report from holter monitor showed 4 beats run of VTACH this AM.   Denies chest pain at this time.

## 2024-05-01 VITALS
OXYGEN SATURATION: 99 % | TEMPERATURE: 98 F | DIASTOLIC BLOOD PRESSURE: 61 MMHG | RESPIRATION RATE: 18 BRPM | HEART RATE: 74 BPM | SYSTOLIC BLOOD PRESSURE: 117 MMHG

## 2024-05-01 LAB
ALBUMIN SERPL ELPH-MCNC: 4.1 G/DL — SIGNIFICANT CHANGE UP (ref 3.3–5)
ALP SERPL-CCNC: 85 U/L — SIGNIFICANT CHANGE UP (ref 40–120)
ALT FLD-CCNC: 67 U/L — HIGH (ref 10–45)
ANION GAP SERPL CALC-SCNC: 15 MMOL/L — SIGNIFICANT CHANGE UP (ref 5–17)
AST SERPL-CCNC: 55 U/L — HIGH (ref 10–40)
BASOPHILS # BLD AUTO: 0.03 K/UL — SIGNIFICANT CHANGE UP (ref 0–0.2)
BASOPHILS NFR BLD AUTO: 0.5 % — SIGNIFICANT CHANGE UP (ref 0–2)
BILIRUB SERPL-MCNC: 0.6 MG/DL — SIGNIFICANT CHANGE UP (ref 0.2–1.2)
BUN SERPL-MCNC: 17 MG/DL — SIGNIFICANT CHANGE UP (ref 7–23)
CALCIUM SERPL-MCNC: 9.1 MG/DL — SIGNIFICANT CHANGE UP (ref 8.4–10.5)
CHLORIDE SERPL-SCNC: 104 MMOL/L — SIGNIFICANT CHANGE UP (ref 96–108)
CO2 SERPL-SCNC: 21 MMOL/L — LOW (ref 22–31)
CREAT SERPL-MCNC: 0.65 MG/DL — SIGNIFICANT CHANGE UP (ref 0.5–1.3)
D DIMER BLD IA.RAPID-MCNC: 303 NG/ML DDU — HIGH
EGFR: 99 ML/MIN/1.73M2 — SIGNIFICANT CHANGE UP
EOSINOPHIL # BLD AUTO: 0.17 K/UL — SIGNIFICANT CHANGE UP (ref 0–0.5)
EOSINOPHIL NFR BLD AUTO: 3.1 % — SIGNIFICANT CHANGE UP (ref 0–6)
FLUAV AG NPH QL: SIGNIFICANT CHANGE UP
FLUBV AG NPH QL: SIGNIFICANT CHANGE UP
GLUCOSE SERPL-MCNC: 135 MG/DL — HIGH (ref 70–99)
HCT VFR BLD CALC: 31.8 % — LOW (ref 34.5–45)
HGB BLD-MCNC: 10.1 G/DL — LOW (ref 11.5–15.5)
IMM GRANULOCYTES NFR BLD AUTO: 0.2 % — SIGNIFICANT CHANGE UP (ref 0–0.9)
LYMPHOCYTES # BLD AUTO: 2.01 K/UL — SIGNIFICANT CHANGE UP (ref 1–3.3)
LYMPHOCYTES # BLD AUTO: 36.6 % — SIGNIFICANT CHANGE UP (ref 13–44)
MAGNESIUM SERPL-MCNC: 2 MG/DL — SIGNIFICANT CHANGE UP (ref 1.6–2.6)
MCHC RBC-ENTMCNC: 29 PG — SIGNIFICANT CHANGE UP (ref 27–34)
MCHC RBC-ENTMCNC: 31.8 GM/DL — LOW (ref 32–36)
MCV RBC AUTO: 91.4 FL — SIGNIFICANT CHANGE UP (ref 80–100)
MONOCYTES # BLD AUTO: 0.69 K/UL — SIGNIFICANT CHANGE UP (ref 0–0.9)
MONOCYTES NFR BLD AUTO: 12.6 % — SIGNIFICANT CHANGE UP (ref 2–14)
NEUTROPHILS # BLD AUTO: 2.58 K/UL — SIGNIFICANT CHANGE UP (ref 1.8–7.4)
NEUTROPHILS NFR BLD AUTO: 47 % — SIGNIFICANT CHANGE UP (ref 43–77)
NRBC # BLD: 0 /100 WBCS — SIGNIFICANT CHANGE UP (ref 0–0)
NT-PROBNP SERPL-SCNC: 295 PG/ML — SIGNIFICANT CHANGE UP (ref 0–300)
PHOSPHATE SERPL-MCNC: 2.9 MG/DL — SIGNIFICANT CHANGE UP (ref 2.5–4.5)
PLATELET # BLD AUTO: 254 K/UL — SIGNIFICANT CHANGE UP (ref 150–400)
POTASSIUM SERPL-MCNC: 4.6 MMOL/L — SIGNIFICANT CHANGE UP (ref 3.5–5.3)
POTASSIUM SERPL-SCNC: 4.6 MMOL/L — SIGNIFICANT CHANGE UP (ref 3.5–5.3)
PROT SERPL-MCNC: 7.1 G/DL — SIGNIFICANT CHANGE UP (ref 6–8.3)
RBC # BLD: 3.48 M/UL — LOW (ref 3.8–5.2)
RBC # FLD: 14.6 % — HIGH (ref 10.3–14.5)
RSV RNA NPH QL NAA+NON-PROBE: SIGNIFICANT CHANGE UP
SARS-COV-2 RNA SPEC QL NAA+PROBE: SIGNIFICANT CHANGE UP
SODIUM SERPL-SCNC: 140 MMOL/L — SIGNIFICANT CHANGE UP (ref 135–145)
TROPONIN T, HIGH SENSITIVITY RESULT: <6 NG/L — SIGNIFICANT CHANGE UP (ref 0–51)
TSH SERPL-MCNC: 5.34 UIU/ML — HIGH (ref 0.27–4.2)
WBC # BLD: 5.49 K/UL — SIGNIFICANT CHANGE UP (ref 3.8–10.5)
WBC # FLD AUTO: 5.49 K/UL — SIGNIFICANT CHANGE UP (ref 3.8–10.5)

## 2024-05-01 PROCEDURE — 99285 EMERGENCY DEPT VISIT HI MDM: CPT | Mod: 25

## 2024-05-01 PROCEDURE — 84484 ASSAY OF TROPONIN QUANT: CPT

## 2024-05-01 PROCEDURE — 87637 SARSCOV2&INF A&B&RSV AMP PRB: CPT

## 2024-05-01 PROCEDURE — 85025 COMPLETE CBC W/AUTO DIFF WBC: CPT

## 2024-05-01 PROCEDURE — 83735 ASSAY OF MAGNESIUM: CPT

## 2024-05-01 PROCEDURE — 85379 FIBRIN DEGRADATION QUANT: CPT

## 2024-05-01 PROCEDURE — 93005 ELECTROCARDIOGRAM TRACING: CPT | Mod: 76

## 2024-05-01 PROCEDURE — 71046 X-RAY EXAM CHEST 2 VIEWS: CPT | Mod: 26

## 2024-05-01 PROCEDURE — 36415 COLL VENOUS BLD VENIPUNCTURE: CPT

## 2024-05-01 PROCEDURE — 83880 ASSAY OF NATRIURETIC PEPTIDE: CPT

## 2024-05-01 PROCEDURE — 71046 X-RAY EXAM CHEST 2 VIEWS: CPT

## 2024-05-01 PROCEDURE — 84100 ASSAY OF PHOSPHORUS: CPT

## 2024-05-01 PROCEDURE — 84443 ASSAY THYROID STIM HORMONE: CPT

## 2024-05-01 PROCEDURE — 80053 COMPREHEN METABOLIC PANEL: CPT

## 2024-05-01 NOTE — ED PROVIDER NOTE - NSFOLLOWUPCLINICS_GEN_ALL_ED_FT
Cardiology at [facility]  Cardiology  [address] [place] [address]  Phone: [phone]  Fax:

## 2024-05-01 NOTE — ED PROVIDER NOTE - PROGRESS NOTE DETAILS
Nick Saldivar MD PGY-2: Patient's labs are nonactionable.  Electrolytes are normal.  Trop was less than 6. Chest x-ray is clear.  Attempted to call patient's PCP ([phone] Mickey Gan) to discuss plan.  No answer and left voicemail with callback number.  Will discharge patient at this time.  Patient can follow up with PCP.

## 2024-05-01 NOTE — ED PROVIDER NOTE - PATIENT PORTAL LINK FT
You can access the FollowMyHealth Patient Portal offered by [facility] by registering at the following website: [email or web address]. By joining [employer]’s FollowMyHealth portal, you will also be able to view your health information using other applications (apps) compatible with our system.

## 2024-05-01 NOTE — ED ADULT NURSE REASSESSMENT NOTE - NS ED NURSE REASSESS COMMENT FT1
Patient a/ox4, breathing spontaneously and unlabored. Patient awaiting response from PCP for recommendations and final chest xray readings. Patient denies chest pain, dyspnea, nausea and vomiting. Safety and comfort provided.

## 2024-05-01 NOTE — ED PROVIDER NOTE - NSFOLLOWUPINSTRUCTIONS_ED_ALL_ED_FT
Your evaluated in the emergency department for palpitations.  Your results were discussed with you and are attached.  Your x-ray was clear and did not show signs of pneumonia.  Please follow-up with your primary care doctor within the week. Please see a cardiologist. You can expect a phone call in the next couple of business days to help you make an appointment. Clinic information is also attached. You can call to make an appointment.    Palpitations    A palpitation is the feeling that your heartbeat is irregular or is faster than normal. It may feel like your heart is fluttering or skipping a beat. They may be caused by many things, including smoking, caffeine, alcohol, stress, and certain medicines. Although most causes of palpitations are not serious, palpitations can be a sign of a serious medical problem. Avoid caffeine, alcohol, and tobacco products at home. Try to reduce stress and anxiety and make sure to get plenty of rest.     SEEK IMMEDIATE MEDICAL CARE IF YOU HAVE ANY OF THE FOLLOWING SYMPTOMS: chest pain, shortness of breath, severe headache, dizziness/lightheadedness, or fainting.    Rest, drink plenty of fluids.  Advance activity as tolerated.  Continue all previously prescribed medications as directed.  Follow up with your primary care physician in 48-72 hours- bring copies of your results.  Return to the ER for worsening or persistent symptoms, and/or ANY NEW OR CONCERNING SYMPTOMS. If you have issues obtaining follow up, please call: [phone] to obtain a doctor or specialist who takes your insurance in your area.

## 2024-05-01 NOTE — ED PROVIDER NOTE - OBJECTIVE STATEMENT
63-year-old female with PMH type 2 diabetes on metformin, hyperlipidemia on statin presents with 1 month of intermittent shortness of breath at rest, and 1 week of palpitations and intermittent left-sided.  Denies chest pain, weakness, nausea, vomiting, diaphoresis.  Denies numbness or tingling.  Endorses occasional lightheadedness.  Saw her PCP 3 times in the last month for the symptoms.  Reports thick phlegm that is clear for the past 4 months.  This morning at 9 AM patient had 4 beats of V. tach and was encouraged to go to the emergency department.  Denies syncope.  Patient has never seen a cardiologist.  Has a remote history of smoking but quit in 1996 prior to that she smoked 1 pack a week for 3 years.  Patient had recent travel from [facility] and came back 3 weeks ago.  Endorses some leg swelling a few days ago.  Denies hemoptysis, cancer history, hormone use.  Denies nausea, vomiting, constipation, diarrhea, fevers, chills. 63-year-old female with PMH type 2 diabetes on metformin, hyperlipidemia on statin presents with 1 month of intermittent shortness of breath at rest, and 1 week of palpitations and intermittent left-sided.  Denies chest pain, weakness, nausea, vomiting, diaphoresis.  Denies numbness or tingling.  Endorses occasional lightheadedness.  Saw her PCP 3 times in the last month for the symptoms.  Reports thick phlegm that is clear for the past 4 months.  This morning at 9 AM patient had 4 beats of V. tach and was encouraged to go to the emergency department.  Denies syncope.  Patient has never seen a cardiologist.  Has a remote history of smoking but quit in  prior to that she smoked 1 pack a week for 3 years.  Patient had recent travel from [facility] and came back 3 weeks ago.  Endorses some leg swelling a few days ago.  Denies hemoptysis, cancer history, hormone use.  Denies nausea, vomiting, constipation, diarrhea, fevers, chills.    Attendinyo female presents with palpitations intermittently for the past month.  also with dyspnea on exertion recently for the past week.  today had notification that she had 4 beats of v-tach on her holter monitor around 9a today.

## 2024-05-01 NOTE — ED PROVIDER NOTE - CLINICAL SUMMARY MEDICAL DECISION MAKING FREE TEXT BOX
63-year-old female with diabetes and hyperlipidemia presents with 1 month of intermittent shortness of breath and 1 week of palpitations with 4 beats of V. tach on Holter monitor today.  Patient is low risk [place].  Exam shows normal S1 and S2, lungs clear to auscultation bilaterally, no lower extremity pitting edema.  Will obtain labs, troponin, EKG, D-dimer, chest x-ray.  Dispo pending results.  If all workup is unremarkable patient can be discharged and follow-up with cardiology.

## 2024-05-02 ENCOUNTER — NON-APPOINTMENT (OUTPATIENT)
Age: 64
End: 2024-05-02

## 2024-05-02 ENCOUNTER — APPOINTMENT (OUTPATIENT)
Dept: CARDIOLOGY | Facility: CLINIC | Age: 64
End: 2024-05-02
Payer: MEDICARE

## 2024-05-02 VITALS
HEART RATE: 64 BPM | SYSTOLIC BLOOD PRESSURE: 108 MMHG | DIASTOLIC BLOOD PRESSURE: 70 MMHG | BODY MASS INDEX: 31.39 KG/M2 | OXYGEN SATURATION: 99 % | HEIGHT: 67 IN | WEIGHT: 200 LBS

## 2024-05-02 DIAGNOSIS — I47.29 OTHER VENTRICULAR TACHYCARDIA: ICD-10-CM

## 2024-05-02 DIAGNOSIS — R00.2 PALPITATIONS: ICD-10-CM

## 2024-05-02 DIAGNOSIS — I49.3 VENTRICULAR PREMATURE DEPOLARIZATION: ICD-10-CM

## 2024-05-02 PROCEDURE — G2211 COMPLEX E/M VISIT ADD ON: CPT

## 2024-05-02 PROCEDURE — 99204 OFFICE O/P NEW MOD 45 MIN: CPT

## 2024-05-02 PROCEDURE — 93000 ELECTROCARDIOGRAM COMPLETE: CPT

## 2024-05-02 RX ORDER — METOPROLOL SUCCINATE 25 MG/1
25 TABLET, EXTENDED RELEASE ORAL DAILY
Qty: 30 | Refills: 0 | Status: ACTIVE | COMMUNITY
Start: 2024-05-02 | End: 1900-01-01

## 2024-05-02 NOTE — PHYSICAL EXAM
[TextEntry] : General/Constitutional: WD/ WN in NAD H: NC/AT Eyes:  PERRL, sclerae and conjunctivae normal without jaundice or xanthelasma; ENMT:normal teeth, gums and palate with no petechiae, pallor or cyanosis Neck: w/o JVD, thyromegaly or adenopathy; normal venous contour Respiratory: clear to auscultation, normal respiratory effort with no retractions or use of accessory respiratory muscles Heart: [id number], regular rate, normal S1, S2 without murmurs, rubs, gallops, heaves or thrills Vascular exam: normal carotid upstrokes without carotid or abdominal bruits. 2+/2+ pulses to posterior tibialis and dorsalis pedis Abdomen: soft, nontender, bowels sounds normal without hepatomegaly or splenomegaly, masses or bruits Musculoskeletal:without significant kyphosis or scoliosis Extremities: w/o CCE, good capillary filling Skin: no stasis changes; no ulcers  Neuro: AA and O x 3; no focal neurologic deficits Psych: normal mood and affect

## 2024-05-02 NOTE — DISCUSSION/SUMMARY
[FreeTextEntry1] : PVCs and nonsustained ventricular tachycardia-it is likely that the patient symptoms do related to ectopy.  I explained to the patient that at this time, I would want to see an echocardiogram.  The presence of nonsustained ventricular ectopy and a structurally normal heart is usually not a cause for concern.  However, I would also like to see the full report of the extended Holter monitor to determine the ectopic burden.  Very frequent PVCs can be associated with transient left ventricular dysfunction.  In the interim, I gave the patient a prescription for metoprolol succinate 25 mg daily as needed.  The patient states that she does not have symptoms every day and I told her that I did not feel the need for her to take the pills every day but if she feels she is having "1 of those days", metoprolol may help relieve the ectopy.  I will make further recommendations once I have seen the patient's echocardiogram and Holter report.   Total time of the encounter: 45 minutes which included but was not limited to the following: Face-to-face and non face-to-face time personally spent by the physician preparing to see the patient, obtaining and/or resuming separately obtained history, performing a medically appropriate examination and/or evaluation, counseling and educating the patient/family/caregiver, ordering medications, tests or procedures, referring and communicating with other healthcare professionals, documenting clinical information in the electronic health record, independently interpreting results and communicated results to the patient/family/caregiver and care coordination.  [EKG obtained to assist in diagnosis and management of assessed problem(s)] : EKG obtained to assist in diagnosis and management of assessed problem(s)

## 2024-05-02 NOTE — REASON FOR VISIT
[FreeTextEntry1] : The patient is a 63-year-old [place] woman who is referred here for evaluation of palpitations.  The patient has a history of diabetes and obesity but is generally very active.  She does not exercise regularly but she is very active around the house and at work with no cardiopulmonary limitations.  The patient recently returned from a trip to [facility].  She went to visit a piece of property that she has within her family.  When she got there, she found out that her brother had taken [place] and built a house there and removed her name from the property.  She did find this extremely emotionally distressing but did not have any physical manifestations of that.  Shortly after her return, she began having episodes of occasional air hunger (difficulty having a full breath with no real dyspnea) and intermittent palpitations.  Laboratory data were drawn which were all reportedly within normal limits.  The patient had placement of an extended Holter monitor.  Several days ago, the monitoring company sent a sample EKG which demonstrated a 4 beat run of V. tach and ventricular bigeminy.  The patient was referred to the emergency room where laboratory data were all apparently within normal limits.  The patient states that she has been having these symptoms of palpitations which are self-limited and rarely lasts for more than seconds sometimes associated with the need to catch a full breath.  She does not find that she is limited in any of her normal activities.

## 2024-05-09 ENCOUNTER — APPOINTMENT (OUTPATIENT)
Dept: CARDIOLOGY | Facility: CLINIC | Age: 64
End: 2024-05-09
Payer: MEDICARE

## 2024-05-09 PROCEDURE — 93306 TTE W/DOPPLER COMPLETE: CPT

## 2024-05-17 ENCOUNTER — APPOINTMENT (OUTPATIENT)
Dept: CT IMAGING | Facility: IMAGING CENTER | Age: 64
End: 2024-05-17

## 2024-07-12 ENCOUNTER — OUTPATIENT (OUTPATIENT)
Dept: OUTPATIENT SERVICES | Facility: HOSPITAL | Age: 64
LOS: 1 days | Discharge: HOME | End: 2024-07-12

## 2024-07-12 DIAGNOSIS — Z98.84 BARIATRIC SURGERY STATUS: Chronic | ICD-10-CM

## 2024-07-12 DIAGNOSIS — Z96.653 PRESENCE OF ARTIFICIAL KNEE JOINT, BILATERAL: Chronic | ICD-10-CM

## 2024-07-12 DIAGNOSIS — D64.9 ANEMIA, UNSPECIFIED: ICD-10-CM

## 2024-07-12 DIAGNOSIS — Z90.49 ACQUIRED ABSENCE OF OTHER SPECIFIED PARTS OF DIGESTIVE TRACT: Chronic | ICD-10-CM

## 2024-07-16 ENCOUNTER — RESULT REVIEW (OUTPATIENT)
Age: 64
End: 2024-07-16

## 2024-07-16 ENCOUNTER — APPOINTMENT (OUTPATIENT)
Dept: HEMATOLOGY ONCOLOGY | Facility: CLINIC | Age: 64
End: 2024-07-16

## 2024-07-16 VITALS
TEMPERATURE: 98.4 F | HEART RATE: 59 BPM | SYSTOLIC BLOOD PRESSURE: 107 MMHG | WEIGHT: 199.28 LBS | DIASTOLIC BLOOD PRESSURE: 73 MMHG | RESPIRATION RATE: 16 BRPM | OXYGEN SATURATION: 98 % | HEIGHT: 65.28 IN | BODY MASS INDEX: 32.8 KG/M2

## 2024-07-16 DIAGNOSIS — D50.9 IRON DEFICIENCY ANEMIA, UNSPECIFIED: ICD-10-CM

## 2024-07-16 LAB
BASOPHILS # BLD AUTO: 0.03 K/UL — SIGNIFICANT CHANGE UP (ref 0–0.2)
BASOPHILS NFR BLD AUTO: 0.5 % — SIGNIFICANT CHANGE UP (ref 0–2)
EOSINOPHIL # BLD AUTO: 0.15 K/UL — SIGNIFICANT CHANGE UP (ref 0–0.5)
EOSINOPHIL NFR BLD AUTO: 2.6 % — SIGNIFICANT CHANGE UP (ref 0–6)
HCT VFR BLD CALC: 36.9 % — SIGNIFICANT CHANGE UP (ref 34.5–45)
HGB BLD-MCNC: 11.6 G/DL — SIGNIFICANT CHANGE UP (ref 11.5–15.5)
IMM GRANULOCYTES NFR BLD AUTO: 0.2 % — SIGNIFICANT CHANGE UP (ref 0–0.9)
LYMPHOCYTES # BLD AUTO: 2.53 K/UL — SIGNIFICANT CHANGE UP (ref 1–3.3)
LYMPHOCYTES # BLD AUTO: 43.2 % — SIGNIFICANT CHANGE UP (ref 13–44)
MCHC RBC-ENTMCNC: 28.9 PG — SIGNIFICANT CHANGE UP (ref 27–34)
MCHC RBC-ENTMCNC: 31.4 G/DL — LOW (ref 32–36)
MCV RBC AUTO: 92 FL — SIGNIFICANT CHANGE UP (ref 80–100)
MONOCYTES # BLD AUTO: 0.48 K/UL — SIGNIFICANT CHANGE UP (ref 0–0.9)
MONOCYTES NFR BLD AUTO: 8.2 % — SIGNIFICANT CHANGE UP (ref 2–14)
NEUTROPHILS # BLD AUTO: 2.65 K/UL — SIGNIFICANT CHANGE UP (ref 1.8–7.4)
NEUTROPHILS NFR BLD AUTO: 45.3 % — SIGNIFICANT CHANGE UP (ref 43–77)
NRBC # BLD: 0 /100 WBCS — SIGNIFICANT CHANGE UP (ref 0–0)
PLATELET # BLD AUTO: 222 K/UL — SIGNIFICANT CHANGE UP (ref 150–400)
RBC # BLD: 4.01 M/UL — SIGNIFICANT CHANGE UP (ref 3.8–5.2)
RBC # FLD: 13.5 % — SIGNIFICANT CHANGE UP (ref 10.3–14.5)
RETICS #: 46.1 K/UL — SIGNIFICANT CHANGE UP (ref 25–125)
RETICS/RBC NFR: 1.2 % — SIGNIFICANT CHANGE UP (ref 0.5–2.5)
WBC # BLD: 5.85 K/UL — SIGNIFICANT CHANGE UP (ref 3.8–10.5)
WBC # FLD AUTO: 5.85 K/UL — SIGNIFICANT CHANGE UP (ref 3.8–10.5)

## 2024-07-16 PROCEDURE — 99205 OFFICE O/P NEW HI 60 MIN: CPT

## 2024-07-17 LAB
ALBUMIN SERPL ELPH-MCNC: 4.4 G/DL
ALP BLD-CCNC: 86 U/L
ALT SERPL-CCNC: 110 U/L
ANION GAP SERPL CALC-SCNC: 10 MMOL/L
AST SERPL-CCNC: 105 U/L
BILIRUB SERPL-MCNC: 0.8 MG/DL
BUN SERPL-MCNC: 12 MG/DL
CALCIUM SERPL-MCNC: 9.1 MG/DL
CHLORIDE SERPL-SCNC: 104 MMOL/L
CO2 SERPL-SCNC: 28 MMOL/L
CREAT SERPL-MCNC: 0.68 MG/DL
EGFR: 98 ML/MIN/1.73M2
FERRITIN SERPL-MCNC: 64 NG/ML
FOLATE SERPL-MCNC: >20 NG/ML
GLUCOSE SERPL-MCNC: 109 MG/DL
HAPTOGLOB SERPL-MCNC: 167 MG/DL
HGB A MFR BLD: 97.5 %
HGB A2 MFR BLD: 2.5 %
HGB FRACT BLD-IMP: NORMAL
IRON SATN MFR SERPL: 37 %
IRON SERPL-MCNC: 142 UG/DL
LDH SERPL-CCNC: 209 U/L
POTASSIUM SERPL-SCNC: 4.4 MMOL/L
PROT SERPL-MCNC: 6.9 G/DL
SODIUM SERPL-SCNC: 142 MMOL/L
T4 FREE SERPL-MCNC: 0.8 NG/DL
TIBC SERPL-MCNC: 385 UG/DL
TSH SERPL-ACNC: 2.47 UIU/ML
UIBC SERPL-MCNC: 243 UG/DL
VIT B12 SERPL-MCNC: 1680 PG/ML

## 2024-07-18 LAB
ALBUMIN MFR SERPL ELPH: 56.2 %
ALBUMIN SERPL-MCNC: 3.9 G/DL
ALBUMIN/GLOB SERPL: 1.3 RATIO
ALPHA1 GLOB MFR SERPL ELPH: 4.1 %
ALPHA1 GLOB SERPL ELPH-MCNC: 0.3 G/DL
ALPHA2 GLOB MFR SERPL ELPH: 10.5 %
ALPHA2 GLOB SERPL ELPH-MCNC: 0.7 G/DL
B-GLOBULIN MFR SERPL ELPH: 12.2 %
B-GLOBULIN SERPL ELPH-MCNC: 0.9 G/DL
DEPRECATED KAPPA LC FREE/LAMBDA SER: 1.26 RATIO
GAMMA GLOB FLD ELPH-MCNC: 1.2 G/DL
GAMMA GLOB MFR SERPL ELPH: 17 %
IGA SER QL IEP: 86 MG/DL
IGG SER QL IEP: 1176 MG/DL
IGM SER QL IEP: 43 MG/DL
INTERPRETATION SERPL IEP-IMP: NORMAL
KAPPA LC CSF-MCNC: 1.48 MG/DL
KAPPA LC SERPL-MCNC: 1.87 MG/DL
M PROTEIN SPEC IFE-MCNC: NORMAL
PROT SERPL-MCNC: 7 G/DL
PROT SERPL-MCNC: 7 G/DL

## 2024-07-22 LAB — METHYLMALONATE SERPL-SCNC: 112 NMOL/L

## 2024-08-01 ENCOUNTER — OUTPATIENT (OUTPATIENT)
Dept: OUTPATIENT SERVICES | Facility: HOSPITAL | Age: 64
LOS: 1 days | End: 2024-08-01
Payer: MEDICARE

## 2024-08-01 ENCOUNTER — APPOINTMENT (OUTPATIENT)
Dept: ULTRASOUND IMAGING | Facility: CLINIC | Age: 64
End: 2024-08-01
Payer: MEDICARE

## 2024-08-01 DIAGNOSIS — Z96.653 PRESENCE OF ARTIFICIAL KNEE JOINT, BILATERAL: Chronic | ICD-10-CM

## 2024-08-01 DIAGNOSIS — Z00.8 ENCOUNTER FOR OTHER GENERAL EXAMINATION: ICD-10-CM

## 2024-08-01 DIAGNOSIS — Z98.84 BARIATRIC SURGERY STATUS: Chronic | ICD-10-CM

## 2024-08-01 DIAGNOSIS — Z90.49 ACQUIRED ABSENCE OF OTHER SPECIFIED PARTS OF DIGESTIVE TRACT: Chronic | ICD-10-CM

## 2024-08-01 PROCEDURE — 76705 ECHO EXAM OF ABDOMEN: CPT | Mod: 26

## 2024-08-01 PROCEDURE — 76705 ECHO EXAM OF ABDOMEN: CPT

## 2024-10-14 ENCOUNTER — APPOINTMENT (OUTPATIENT)
Dept: ENDOCRINOLOGY | Facility: CLINIC | Age: 64
End: 2024-10-14

## 2025-02-26 NOTE — PLAN
Department of Anesthesiology  Postprocedure Note    Patient: Ness Castañeda  MRN: [id number]  YOB: 1948  Date of evaluation: 2/26/2025    Procedure Summary       Date: 02/26/25 Room / Location: [address]    Anesthesia Start: 1107 Anesthesia Stop: 1435    Procedure: LEFT LAPAROSCOPIC ROBOTIC XI ASSITED OPEN SIGMOID RESECTION WITH TAP BLOCK (Abdomen/Perineum) Diagnosis:       Diverticulosis      (Diverticulosis [K57.90])    Surgeons: Fracisco Barksdale MD Responsible Provider: Marc Vitale DO    Anesthesia Type: general ASA Status: 3            Anesthesia Type: No value filed.    Raina Phase I: Raina Score: 8    Raina Phase II:      Anesthesia Post Evaluation    Patient location during evaluation: PACU  Patient participation: complete - patient participated  Level of consciousness: awake and alert  Airway patency: patent  Nausea & Vomiting: no nausea and no vomiting  Cardiovascular status: hemodynamically stable  Respiratory status: acceptable  Hydration status: stable  Pain management: adequate    No notable events documented.   [FreeTextEntry1] : pneum 23 vaccine given\par palpitation- pt to limit caffeine use.  f/u if palpitation return

## 2025-04-16 ENCOUNTER — APPOINTMENT (OUTPATIENT)
Dept: UROGYNECOLOGY | Facility: CLINIC | Age: 65
End: 2025-04-16
Payer: MEDICARE

## 2025-04-16 VITALS
HEIGHT: 66 IN | SYSTOLIC BLOOD PRESSURE: 110 MMHG | WEIGHT: 185 LBS | BODY MASS INDEX: 29.73 KG/M2 | HEART RATE: 70 BPM | DIASTOLIC BLOOD PRESSURE: 76 MMHG

## 2025-04-16 DIAGNOSIS — Z86.39 PERSONAL HISTORY OF OTHER ENDOCRINE, NUTRITIONAL AND METABOLIC DISEASE: ICD-10-CM

## 2025-04-16 DIAGNOSIS — Z87.828 PERSONAL HISTORY OF OTHER (HEALED) PHYSICAL INJURY AND TRAUMA: ICD-10-CM

## 2025-04-16 DIAGNOSIS — N95.2 POSTMENOPAUSAL ATROPHIC VAGINITIS: ICD-10-CM

## 2025-04-16 DIAGNOSIS — N95.8 OTHER SPECIFIED MENOPAUSAL AND PERIMENOPAUSAL DISORDERS: ICD-10-CM

## 2025-04-16 DIAGNOSIS — R39.14 FEELING OF INCOMPLETE BLADDER EMPTYING: ICD-10-CM

## 2025-04-16 LAB
BILIRUB UR QL STRIP: NORMAL
CLARITY UR: CLEAR
COLLECTION METHOD: NORMAL
GLUCOSE UR-MCNC: NORMAL
HCG UR QL: 0.2 EU/DL
HGB UR QL STRIP.AUTO: NORMAL
KETONES UR-MCNC: NORMAL
LEUKOCYTE ESTERASE UR QL STRIP: NORMAL
NITRITE UR QL STRIP: NORMAL
PH UR STRIP: 5.5
PROT UR STRIP-MCNC: NORMAL
SP GR UR STRIP: 1.02

## 2025-04-16 PROCEDURE — 99459 PELVIC EXAMINATION: CPT

## 2025-04-16 PROCEDURE — 81003 URINALYSIS AUTO W/O SCOPE: CPT | Mod: QW

## 2025-04-16 PROCEDURE — 51701 INSERT BLADDER CATHETER: CPT

## 2025-04-16 PROCEDURE — 99204 OFFICE O/P NEW MOD 45 MIN: CPT | Mod: 25

## 2025-04-16 RX ORDER — ESTRADIOL 10 UG/1
10 TABLET, FILM COATED VAGINAL
Qty: 28 | Refills: 1 | Status: ACTIVE | COMMUNITY
Start: 2025-04-16 | End: 1900-01-01

## 2025-04-22 ENCOUNTER — APPOINTMENT (OUTPATIENT)
Dept: UROGYNECOLOGY | Facility: CLINIC | Age: 65
End: 2025-04-22
Payer: MEDICARE

## 2025-04-22 VITALS
HEIGHT: 67 IN | HEART RATE: 67 BPM | BODY MASS INDEX: 28.88 KG/M2 | SYSTOLIC BLOOD PRESSURE: 104 MMHG | DIASTOLIC BLOOD PRESSURE: 79 MMHG | WEIGHT: 184 LBS

## 2025-04-22 DIAGNOSIS — R82.90 UNSPECIFIED ABNORMAL FINDINGS IN URINE: ICD-10-CM

## 2025-04-22 LAB
APPEARANCE: CLEAR
BACTERIA UR CULT: NORMAL
BACTERIA: ABNORMAL /HPF
BILIRUBIN URINE: NEGATIVE
BLOOD URINE: NEGATIVE
CAST: 1 /LPF
COLOR: YELLOW
EPITHELIAL CELLS: 7 /HPF
GLUCOSE QUALITATIVE U: NEGATIVE MG/DL
KETONES URINE: 15 MG/DL
LEUKOCYTE ESTERASE URINE: ABNORMAL
MICROSCOPIC-UA: NORMAL
NITRITE URINE: NEGATIVE
PH URINE: 5.5
PROTEIN URINE: NORMAL MG/DL
RED BLOOD CELLS URINE: 3 /HPF
SPECIFIC GRAVITY URINE: 1.03
UROBILINOGEN URINE: 0.2 MG/DL
WHITE BLOOD CELLS URINE: 1 /HPF

## 2025-04-22 PROCEDURE — 51701 INSERT BLADDER CATHETER: CPT

## 2025-04-22 PROCEDURE — 99459 PELVIC EXAMINATION: CPT

## 2025-04-22 PROCEDURE — 99214 OFFICE O/P EST MOD 30 MIN: CPT | Mod: 25

## 2025-04-24 LAB — BACTERIA UR CULT: NORMAL

## 2025-04-30 LAB
APPEARANCE: CLEAR
BACTERIA: NEGATIVE /HPF
BILIRUBIN URINE: NEGATIVE
BLOOD URINE: NEGATIVE
CALCIUM OXALATE CRYSTALS: PRESENT
CAST: 2 /LPF
COLOR: NORMAL
EPITHELIAL CELLS: 1 /HPF
GLUCOSE QUALITATIVE U: 250 MG/DL
HYALINE CASTS: PRESENT
KETONES URINE: ABNORMAL MG/DL
LEUKOCYTE ESTERASE URINE: ABNORMAL
MICROSCOPIC-UA: NORMAL
MUCUS: PRESENT
NITRITE URINE: NEGATIVE
PH URINE: 6
PROTEIN URINE: NORMAL MG/DL
RED BLOOD CELLS URINE: NORMAL /HPF
REVIEW: NORMAL
SPECIFIC GRAVITY URINE: 1.03
UROBILINOGEN URINE: 1 MG/DL
WHITE BLOOD CELLS URINE: 1 /HPF

## 2025-05-16 ENCOUNTER — OUTPATIENT (OUTPATIENT)
Dept: OUTPATIENT SERVICES | Facility: HOSPITAL | Age: 65
LOS: 1 days | Discharge: HOME | End: 2025-05-16

## 2025-05-16 DIAGNOSIS — Z98.84 BARIATRIC SURGERY STATUS: Chronic | ICD-10-CM

## 2025-05-16 DIAGNOSIS — D64.9 ANEMIA, UNSPECIFIED: ICD-10-CM

## 2025-05-16 DIAGNOSIS — Z90.49 ACQUIRED ABSENCE OF OTHER SPECIFIED PARTS OF DIGESTIVE TRACT: Chronic | ICD-10-CM

## 2025-05-16 DIAGNOSIS — Z96.653 PRESENCE OF ARTIFICIAL KNEE JOINT, BILATERAL: Chronic | ICD-10-CM

## 2025-05-19 ENCOUNTER — APPOINTMENT (OUTPATIENT)
Dept: HEMATOLOGY ONCOLOGY | Facility: CLINIC | Age: 65
End: 2025-05-19
Payer: MEDICARE

## 2025-05-19 ENCOUNTER — RESULT REVIEW (OUTPATIENT)
Age: 65
End: 2025-05-19

## 2025-05-19 ENCOUNTER — NON-APPOINTMENT (OUTPATIENT)
Age: 65
End: 2025-05-19

## 2025-05-19 DIAGNOSIS — D50.9 IRON DEFICIENCY ANEMIA, UNSPECIFIED: ICD-10-CM

## 2025-05-19 LAB
BASOPHILS # BLD AUTO: 0.03 K/UL — SIGNIFICANT CHANGE UP (ref 0–0.2)
BASOPHILS NFR BLD AUTO: 0.4 % — SIGNIFICANT CHANGE UP (ref 0–2)
EOSINOPHIL # BLD AUTO: 0.24 K/UL — SIGNIFICANT CHANGE UP (ref 0–0.5)
EOSINOPHIL NFR BLD AUTO: 3.4 % — SIGNIFICANT CHANGE UP (ref 0–6)
HCT VFR BLD CALC: 36.4 % — SIGNIFICANT CHANGE UP (ref 34.5–45)
HGB BLD-MCNC: 11.7 G/DL — SIGNIFICANT CHANGE UP (ref 11.5–15.5)
IMM GRANULOCYTES NFR BLD AUTO: 0.3 % — SIGNIFICANT CHANGE UP (ref 0–0.9)
LYMPHOCYTES # BLD AUTO: 2.8 K/UL — SIGNIFICANT CHANGE UP (ref 1–3.3)
LYMPHOCYTES # BLD AUTO: 39.7 % — SIGNIFICANT CHANGE UP (ref 13–44)
MCHC RBC-ENTMCNC: 29.6 PG — SIGNIFICANT CHANGE UP (ref 27–34)
MCHC RBC-ENTMCNC: 32.1 G/DL — SIGNIFICANT CHANGE UP (ref 32–36)
MCV RBC AUTO: 92.2 FL — SIGNIFICANT CHANGE UP (ref 80–100)
MONOCYTES # BLD AUTO: 0.67 K/UL — SIGNIFICANT CHANGE UP (ref 0–0.9)
MONOCYTES NFR BLD AUTO: 9.5 % — SIGNIFICANT CHANGE UP (ref 2–14)
NEUTROPHILS # BLD AUTO: 3.29 K/UL — SIGNIFICANT CHANGE UP (ref 1.8–7.4)
NEUTROPHILS NFR BLD AUTO: 46.7 % — SIGNIFICANT CHANGE UP (ref 43–77)
NRBC BLD AUTO-RTO: 0 /100 WBCS — SIGNIFICANT CHANGE UP (ref 0–0)
PLATELET # BLD AUTO: 232 K/UL — SIGNIFICANT CHANGE UP (ref 150–400)
RBC # BLD: 3.95 M/UL — SIGNIFICANT CHANGE UP (ref 3.8–5.2)
RBC # FLD: 14.1 % — SIGNIFICANT CHANGE UP (ref 10.3–14.5)
RETICS #: 36.7 K/UL — SIGNIFICANT CHANGE UP (ref 25–125)
RETICS/RBC NFR: 0.9 % — SIGNIFICANT CHANGE UP (ref 0.5–2.5)
WBC # BLD: 7.05 K/UL — SIGNIFICANT CHANGE UP (ref 3.8–10.5)
WBC # FLD AUTO: 7.05 K/UL — SIGNIFICANT CHANGE UP (ref 3.8–10.5)

## 2025-05-19 PROCEDURE — 99215 OFFICE O/P EST HI 40 MIN: CPT

## 2025-05-19 PROCEDURE — G2211 COMPLEX E/M VISIT ADD ON: CPT

## 2025-05-20 ENCOUNTER — NON-APPOINTMENT (OUTPATIENT)
Age: 65
End: 2025-05-20

## 2025-05-20 LAB
ALBUMIN SERPL ELPH-MCNC: 4 G/DL
ALBUMIN SERPL ELPH-MCNC: 4.3 G/DL
ALP BLD-CCNC: 74 U/L
ALP BLD-CCNC: 75 U/L
ALT SERPL-CCNC: 15 U/L
ALT SERPL-CCNC: 15 U/L
ANION GAP SERPL CALC-SCNC: 15 MMOL/L
ANION GAP SERPL CALC-SCNC: 15 MMOL/L
APTT 2H P 1:4 NP PPP: 33.1 SEC
APTT 2H P INC PPP: 32 SEC
APTT BLD: 39.1 SEC
APTT IMM NP/PRE NP PPP: 31.7 SEC
APTT INV RATIO PPP: 39.1 SEC
AST SERPL-CCNC: 19 U/L
AST SERPL-CCNC: 23 U/L
BILIRUB SERPL-MCNC: 0.7 MG/DL
BILIRUB SERPL-MCNC: 0.8 MG/DL
BUN SERPL-MCNC: 15 MG/DL
BUN SERPL-MCNC: 15 MG/DL
CALCIUM SERPL-MCNC: 8.7 MG/DL
CALCIUM SERPL-MCNC: 9.3 MG/DL
CHLORIDE SERPL-SCNC: 102 MMOL/L
CHLORIDE SERPL-SCNC: 103 MMOL/L
CO2 SERPL-SCNC: 22 MMOL/L
CO2 SERPL-SCNC: 24 MMOL/L
CREAT SERPL-MCNC: 0.65 MG/DL
CREAT SERPL-MCNC: 0.65 MG/DL
EGFRCR SERPLBLD CKD-EPI 2021: 98 ML/MIN/1.73M2
EGFRCR SERPLBLD CKD-EPI 2021: 98 ML/MIN/1.73M2
FACT IX ACT/NOR PPP: 115 %
FACT VIII ACT/NOR PPP: 176 %
FACT XI ACT/NOR PPP: 109 %
FACT XII PPP CHRO-ACNC: 78 %
FERRITIN SERPL-MCNC: 102 NG/ML
FERRITIN SERPL-MCNC: 146 NG/ML
FIBRINOGEN PPP-MCNC: 280 MG/DL
FOLATE SERPL-MCNC: >20 NG/ML
GLUCOSE SERPL-MCNC: 79 MG/DL
GLUCOSE SERPL-MCNC: 81 MG/DL
IRON SATN MFR SERPL: 33 %
IRON SATN MFR SERPL: 35 %
IRON SERPL-MCNC: 105 UG/DL
IRON SERPL-MCNC: 117 UG/DL
NPP NORMAL POOLED PLASMA: 31.1 SEC
POTASSIUM SERPL-SCNC: 4.9 MMOL/L
POTASSIUM SERPL-SCNC: 5.2 MMOL/L
PROT SERPL-MCNC: 6.7 G/DL
PROT SERPL-MCNC: 6.9 G/DL
SODIUM SERPL-SCNC: 140 MMOL/L
SODIUM SERPL-SCNC: 140 MMOL/L
TIBC SERPL-MCNC: 322 UG/DL
TIBC SERPL-MCNC: 333 UG/DL
TT CONT PPP: 16.1 SEC
UIBC SERPL-MCNC: 216 UG/DL
UIBC SERPL-MCNC: 217 UG/DL
VIT B12 SERPL-MCNC: >2000 PG/ML
VWF AG PPP IA-ACNC: 230 %
VWF:RCO ACT/NOR PPP PL AGG: 221 %

## 2025-05-21 ENCOUNTER — NON-APPOINTMENT (OUTPATIENT)
Age: 65
End: 2025-05-21

## 2025-06-18 ENCOUNTER — APPOINTMENT (OUTPATIENT)
Dept: UROGYNECOLOGY | Facility: CLINIC | Age: 65
End: 2025-06-18